# Patient Record
Sex: FEMALE | Race: BLACK OR AFRICAN AMERICAN
[De-identification: names, ages, dates, MRNs, and addresses within clinical notes are randomized per-mention and may not be internally consistent; named-entity substitution may affect disease eponyms.]

---

## 2017-07-31 ENCOUNTER — HOSPITAL ENCOUNTER (EMERGENCY)
Dept: HOSPITAL 23 - SED | Age: 22
Discharge: HOME | End: 2017-07-31
Payer: COMMERCIAL

## 2017-07-31 DIAGNOSIS — N76.0: Primary | ICD-10-CM

## 2017-07-31 LAB
MICRO INDICATED?: NO
URINE APPEARANCE: CLEAR
URINE BILIRUBIN: (no result)
URINE BLOOD: (no result)
URINE COLOR: YELLOW
URINE GLUCOSE: (no result) MG/DL
URINE KETONE: (no result)
URINE LEUKOCYTE ESTERASE: (no result)
URINE NITRATE: (no result)
URINE PH: 6.5 (ref 5–8)
URINE PROTEIN: (no result)
URINE SOURCE: (no result)
URINE SPECIFIC GRAVITY: 1.02 (ref 1–1.03)
URINE UROBILINOGEN: 0.2 MG/DL

## 2019-04-24 ENCOUNTER — HOSPITAL ENCOUNTER (OUTPATIENT)
Dept: OTHER | Facility: HOSPITAL | Age: 24
Discharge: HOME | End: 2019-04-24

## 2019-04-24 LAB
25(OH)D3 SERPL-MCNC: 11.4 NG/ML (ref 30–100)
ALBUMIN SERPL-MCNC: 4.5 G/DL (ref 3.5–5)
ALBUMIN/GLOB SERPL: 1.4 {RATIO} (ref 1.4–2.6)
ALP SERPL-CCNC: 42 U/L (ref 42–98)
ALT SERPL-CCNC: 16 U/L (ref 10–40)
ANION GAP SERPL CALC-SCNC: 16 MMOL/L (ref 8–19)
AST SERPL-CCNC: 19 U/L (ref 15–50)
BASOPHILS # BLD MANUAL: 0.03 10*3/UL (ref 0–0.2)
BASOPHILS NFR BLD MANUAL: 0.6 % (ref 0–3)
BILIRUB SERPL-MCNC: 0.4 MG/DL (ref 0.2–1.3)
BUN SERPL-MCNC: 10 MG/DL (ref 5–25)
BUN/CREAT SERPL: 13 {RATIO} (ref 6–20)
CALCIUM SERPL-MCNC: 9 MG/DL (ref 8.7–10.4)
CHLORIDE SERPL-SCNC: 105 MMOL/L (ref 99–111)
CONV CO2: 23 MMOL/L (ref 22–32)
CONV TOTAL PROTEIN: 7.7 G/DL (ref 6.3–8.2)
CREAT UR-MCNC: 0.76 MG/DL (ref 0.5–0.9)
DEPRECATED RDW RBC AUTO: 38.2 FL
EOSINOPHIL # BLD MANUAL: 0.04 10*3/UL (ref 0–0.7)
EOSINOPHIL NFR BLD MANUAL: 0.8 % (ref 0–7)
ERYTHROCYTE [DISTWIDTH] IN BLOOD BY AUTOMATED COUNT: 11.8 % (ref 11.5–14.5)
GFR SERPLBLD BASED ON 1.73 SQ M-ARVRAT: >60 ML/MIN/{1.73_M2}
GLOBULIN UR ELPH-MCNC: 3.2 G/DL (ref 2–3.5)
GLUCOSE SERPL-MCNC: 85 MG/DL (ref 65–99)
GRANS (ABSOLUTE): 2.71 10*3/UL (ref 2–8)
GRANS: 55.6 % (ref 30–85)
HBA1C MFR BLD: 12.7 G/DL (ref 12–16)
HCT VFR BLD AUTO: 38 % (ref 37–47)
IMM GRANULOCYTES # BLD: 0 10*3/UL (ref 0–0.54)
IMM GRANULOCYTES NFR BLD: 0 % (ref 0–0.43)
LYMPHOCYTES # BLD MANUAL: 1.78 10*3/UL (ref 1–5)
LYMPHOCYTES NFR BLD MANUAL: 6.4 % (ref 3–10)
MCH RBC QN AUTO: 28.8 PG (ref 27–31)
MCHC RBC AUTO-ENTMCNC: 33.4 G/DL (ref 33–37)
MCV RBC AUTO: 86.2 FL (ref 81–99)
MONOCYTES # BLD AUTO: 0.31 10*3/UL (ref 0.2–1.2)
OSMOLALITY SERPL CALC.SUM OF ELEC: 288 MOSM/KG (ref 273–304)
PLATELET # BLD AUTO: 418 10*3/UL (ref 130–400)
PMV BLD AUTO: 9.3 FL (ref 7.4–10.4)
POTASSIUM SERPL-SCNC: 4 MMOL/L (ref 3.5–5.3)
RBC # BLD AUTO: 4.41 10*6/UL (ref 4.2–5.4)
SODIUM SERPL-SCNC: 140 MMOL/L (ref 135–147)
TSH SERPL-ACNC: 1.16 M[IU]/L (ref 0.27–4.2)
VARIANT LYMPHS NFR BLD MANUAL: 36.6 % (ref 20–45)
WBC # BLD AUTO: 4.87 10*3/UL (ref 4.8–10.8)

## 2019-04-26 LAB
AMOXICILLIN+CLAV SUSC ISLT: 4
AMPICILLIN SUSC ISLT: >=32
AMPICILLIN+SULBAC SUSC ISLT: 8
BACTERIA UR CULT: ABNORMAL
CEFAZOLIN SUSC ISLT: <=4
CEFEPIME SUSC ISLT: <=1
CEFTAZIDIME SUSC ISLT: <=1
CEFTRIAXONE SUSC ISLT: <=1
CEFUROXIME ORAL SUSC ISLT: <=1
CEFUROXIME PARENTER SUSC ISLT: <=1
CIPROFLOXACIN SUSC ISLT: <=0.25
ERTAPENEM SUSC ISLT: <=0.5
GENTAMICIN SUSC ISLT: <=1
LEVOFLOXACIN SUSC ISLT: <=0.12
NITROFURANTOIN SUSC ISLT: <=16
TETRACYCLINE SUSC ISLT: <=1
TMP SMX SUSC ISLT: <=20
TOBRAMYCIN SUSC ISLT: <=1

## 2019-04-27 LAB
CONV EBV EARLY ANTIGEN: <5 U/ML (ref 0–10.9)
CONV EBV NUCLEAR ANTIGEN: >600 U/ML (ref 0–21.9)
CONV EPSTEIN BARR VIRAL CAPSID IGM: 26.2 U/ML (ref 0–43.9)
CONV EPSTEIN BARR VIRUS ANTIBODY TO VIRAL CAPSID IGG: 255 U/ML (ref 0–21.9)

## 2021-04-14 ENCOUNTER — HOSPITAL ENCOUNTER (OUTPATIENT)
Dept: URGENT CARE | Facility: CLINIC | Age: 26
Discharge: HOME | End: 2021-04-14
Attending: FAMILY MEDICINE

## 2021-11-11 ENCOUNTER — APPOINTMENT (OUTPATIENT)
Dept: ULTRASOUND IMAGING | Facility: HOSPITAL | Age: 26
End: 2021-11-11

## 2021-11-11 ENCOUNTER — HOSPITAL ENCOUNTER (EMERGENCY)
Facility: HOSPITAL | Age: 26
Discharge: HOME | End: 2021-11-11
Attending: EMERGENCY MEDICINE | Admitting: EMERGENCY MEDICINE

## 2021-11-11 VITALS
SYSTOLIC BLOOD PRESSURE: 121 MMHG | OXYGEN SATURATION: 100 % | HEART RATE: 77 BPM | HEIGHT: 61 IN | DIASTOLIC BLOOD PRESSURE: 79 MMHG | WEIGHT: 110.89 LBS | BODY MASS INDEX: 20.94 KG/M2 | RESPIRATION RATE: 18 BRPM | TEMPERATURE: 98 F

## 2021-11-11 DIAGNOSIS — N92.1 MENOMETRORRHAGIA: Primary | ICD-10-CM

## 2021-11-11 LAB
B-HCG UR QL: NEGATIVE
BACTERIA UR QL AUTO: ABNORMAL /HPF
BASOPHILS # BLD AUTO: 0.06 10*3/MM3 (ref 0–0.2)
BASOPHILS NFR BLD AUTO: 1.7 % (ref 0–1.5)
BILIRUB UR QL STRIP: NEGATIVE
CLARITY UR: CLEAR
COLOR UR: YELLOW
DEPRECATED RDW RBC AUTO: 37.5 FL (ref 37–54)
EOSINOPHIL # BLD AUTO: 0.07 10*3/MM3 (ref 0–0.4)
EOSINOPHIL NFR BLD AUTO: 2 % (ref 0.3–6.2)
ERYTHROCYTE [DISTWIDTH] IN BLOOD BY AUTOMATED COUNT: 11.8 % (ref 12.3–15.4)
GLUCOSE UR STRIP-MCNC: NEGATIVE MG/DL
HCT VFR BLD AUTO: 38 % (ref 34–46.6)
HGB BLD-MCNC: 12.3 G/DL (ref 12–15.9)
HGB UR QL STRIP.AUTO: ABNORMAL
HOLD SPECIMEN: NORMAL
HOLD SPECIMEN: NORMAL
HYALINE CASTS UR QL AUTO: ABNORMAL /LPF
IMM GRANULOCYTES # BLD AUTO: 0.01 10*3/MM3 (ref 0–0.05)
IMM GRANULOCYTES NFR BLD AUTO: 0.3 % (ref 0–0.5)
KETONES UR QL STRIP: ABNORMAL
LEUKOCYTE ESTERASE UR QL STRIP.AUTO: NEGATIVE
LYMPHOCYTES # BLD AUTO: 1.4 10*3/MM3 (ref 0.7–3.1)
LYMPHOCYTES NFR BLD AUTO: 40 % (ref 19.6–45.3)
MCH RBC QN AUTO: 28.6 PG (ref 26.6–33)
MCHC RBC AUTO-ENTMCNC: 32.4 G/DL (ref 31.5–35.7)
MCV RBC AUTO: 88.4 FL (ref 79–97)
MONOCYTES # BLD AUTO: 0.31 10*3/MM3 (ref 0.1–0.9)
MONOCYTES NFR BLD AUTO: 8.9 % (ref 5–12)
NEUTROPHILS NFR BLD AUTO: 1.65 10*3/MM3 (ref 1.7–7)
NEUTROPHILS NFR BLD AUTO: 47.1 % (ref 42.7–76)
NITRITE UR QL STRIP: NEGATIVE
NRBC BLD AUTO-RTO: 0 /100 WBC (ref 0–0.2)
PH UR STRIP.AUTO: 7 [PH] (ref 5–8)
PLATELET # BLD AUTO: 429 10*3/MM3 (ref 140–450)
PMV BLD AUTO: 9.5 FL (ref 6–12)
PROT UR QL STRIP: NEGATIVE
RBC # BLD AUTO: 4.3 10*6/MM3 (ref 3.77–5.28)
RBC # UR: ABNORMAL /HPF
REF LAB TEST METHOD: ABNORMAL
SP GR UR STRIP: 1.01 (ref 1–1.03)
SQUAMOUS #/AREA URNS HPF: ABNORMAL /HPF
UROBILINOGEN UR QL STRIP: ABNORMAL
WBC # BLD AUTO: 3.5 10*3/MM3 (ref 3.4–10.8)
WBC UR QL AUTO: ABNORMAL /HPF
WHOLE BLOOD HOLD SPECIMEN: NORMAL
WHOLE BLOOD HOLD SPECIMEN: NORMAL

## 2021-11-11 PROCEDURE — 36415 COLL VENOUS BLD VENIPUNCTURE: CPT | Performed by: EMERGENCY MEDICINE

## 2021-11-11 PROCEDURE — 76830 TRANSVAGINAL US NON-OB: CPT

## 2021-11-11 PROCEDURE — 81001 URINALYSIS AUTO W/SCOPE: CPT | Performed by: EMERGENCY MEDICINE

## 2021-11-11 PROCEDURE — 81025 URINE PREGNANCY TEST: CPT | Performed by: EMERGENCY MEDICINE

## 2021-11-11 PROCEDURE — 99283 EMERGENCY DEPT VISIT LOW MDM: CPT

## 2021-11-11 PROCEDURE — 85025 COMPLETE CBC W/AUTO DIFF WBC: CPT | Performed by: EMERGENCY MEDICINE

## 2021-11-11 RX ORDER — SODIUM CHLORIDE 0.9 % (FLUSH) 0.9 %
10 SYRINGE (ML) INJECTION AS NEEDED
Status: DISCONTINUED | OUTPATIENT
Start: 2021-11-11 | End: 2021-11-11 | Stop reason: HOSPADM

## 2021-11-11 NOTE — ED PROVIDER NOTES
Time: 10:16 AM EST  Arrived by: private car  Chief Complaint: Irregular vaginal bleeding  History provided by: Patient  History is limited by: N/A     History of Present Illness:  Patient is a 26 y.o. year old female that presents to the emergency department with several months of irregular r vaginal bleeding.  Patient has been seen multiple times at outside facilities for this complaint since the start of this year.  Most recently she was seen in August 2021 for the same complaint.  She had a pelvic exam and ultrasound.  She states that they found no definite cyst but that was her presumed diagnosis.  She had testing for STDs that all came back negative.  She denies any vaginal discharge or vaginal lesions.  She had no insurance, so was waiting to see her OB/GYN.  She complains of having vaginal bleeding daily in the past month.  She states she has gone through 3 pads per day on average.  This has caused her to have lightheadedness, worse with standing.  She complains of right adnexal crampy/sharp pain intermittently.    HPI    Similar Symptoms Previously: Yes  Recently seen: Yes      Patient Care Team  Primary Care Provider: Provider, No Known    Past Medical History:     Allergies   Allergen Reactions   • Latex Hives   • Penicillins Hives     History reviewed. No pertinent past medical history.  History reviewed. No pertinent surgical history.  History reviewed. No pertinent family history.    Home Medications:  Prior to Admission medications    Not on File        Social History:   Social History     Tobacco Use   • Smoking status: Not on file   • Smokeless tobacco: Not on file   Substance Use Topics   • Alcohol use: Not on file   • Drug use: Not on file     Recent travel: no     Review of Systems:  Review of Systems   Constitutional: Negative for chills and fever.   HENT: Negative for congestion, rhinorrhea and sore throat.    Eyes: Negative for pain.   Respiratory: Negative for apnea, cough, chest tightness and  "shortness of breath.    Cardiovascular: Negative for chest pain and palpitations.   Gastrointestinal: Positive for nausea. Negative for abdominal pain, constipation, diarrhea and vomiting.   Genitourinary: Positive for pelvic pain and vaginal bleeding. Negative for difficulty urinating, dysuria, genital sores and vaginal discharge.   Musculoskeletal: Positive for back pain. Negative for joint swelling and myalgias.   Skin: Negative for color change and rash.   Neurological: Negative for seizures and headaches.   Psychiatric/Behavioral: Negative.    All other systems reviewed and are negative.       Physical Exam:  /79 (BP Location: Left arm, Patient Position: Sitting)   Pulse 77   Temp 98 °F (36.7 °C) (Oral)   Resp 18   Ht 154.9 cm (61\")   Wt 50.3 kg (110 lb 14.3 oz)   SpO2 100%   Breastfeeding No   BMI 20.95 kg/m²     Physical Exam  Vitals and nursing note reviewed.   Constitutional:       General: She is not in acute distress.     Appearance: Normal appearance. She is normal weight.   HENT:      Head: Normocephalic and atraumatic.      Nose: Nose normal.      Mouth/Throat:      Mouth: Mucous membranes are dry.   Eyes:      Extraocular Movements: Extraocular movements intact.      Pupils: Pupils are equal, round, and reactive to light.   Cardiovascular:      Rate and Rhythm: Normal rate and regular rhythm.      Heart sounds: Normal heart sounds.   Pulmonary:      Effort: Pulmonary effort is normal.      Breath sounds: Normal breath sounds.   Abdominal:      General: Abdomen is flat. Bowel sounds are normal.      Palpations: Abdomen is soft.      Tenderness: There is abdominal tenderness (Mild TTP right adnexal area).   Musculoskeletal:         General: No swelling. Normal range of motion.      Cervical back: Normal range of motion and neck supple.   Skin:     General: Skin is warm and dry.      Coloration: Skin is not jaundiced.   Neurological:      General: No focal deficit present.      Mental " Status: She is alert and oriented to person, place, and time. Mental status is at baseline.   Psychiatric:         Mood and Affect: Mood normal.         Behavior: Behavior normal.         Judgment: Judgment normal.                Medications in the Emergency Department:  Medications   sodium chloride 0.9 % flush 10 mL (has no administration in time range)        Labs  Lab Results (last 24 hours)     Procedure Component Value Units Date/Time    CBC & Differential [[id number]]  (Abnormal) Collected: 11/11/21 0850    Specimen: Blood Updated: 11/11/21 0930    Narrative:      The following orders were created for panel order CBC & Differential.  Procedure                               Abnormality         Status                     ---------                               -----------         ------                     CBC Auto Differential[[id number]]        Abnormal            Final result                 Please view results for these tests on the individual orders.    CBC Auto Differential [[id number]]  (Abnormal) Collected: 11/11/21 0850    Specimen: Blood Updated: 11/11/21 0930     WBC 3.50 10*3/mm3      RBC 4.30 10*6/mm3      Hemoglobin 12.3 g/dL      Hematocrit 38.0 %      MCV 88.4 fL      MCH 28.6 pg      MCHC 32.4 g/dL      RDW 11.8 %      RDW-SD 37.5 fl      MPV 9.5 fL      Platelets 429 10*3/mm3      Neutrophil % 47.1 %      Lymphocyte % 40.0 %      Monocyte % 8.9 %      Eosinophil % 2.0 %      Basophil % 1.7 %      Immature Grans % 0.3 %      Neutrophils, Absolute 1.65 10*3/mm3      Lymphocytes, Absolute 1.40 10*3/mm3      Monocytes, Absolute 0.31 10*3/mm3      Eosinophils, Absolute 0.07 10*3/mm3      Basophils, Absolute 0.06 10*3/mm3      Immature Grans, Absolute 0.01 10*3/mm3      nRBC 0.0 /100 WBC     Urinalysis With Culture If Indicated - Urine, Clean Catch [[id number]]  (Abnormal) Collected: 11/11/21 1248    Specimen: Urine, Clean Catch Updated: 11/11/21 1309     Color, UA Yellow     Appearance, UA Clear      pH, UA 7.0     Specific Gravity, UA 1.011     Glucose, UA Negative     Ketones, UA Trace     Bilirubin, UA Negative     Blood, UA Moderate (2+)     Protein, UA Negative     Leuk Esterase, UA Negative     Nitrite, UA Negative     Urobilinogen, UA 0.2 E.U./dL    Pregnancy, Urine - Urine, Clean Catch [[id number]]  (Normal) Collected: 11/11/21 1248    Specimen: Urine, Clean Catch Updated: 11/11/21 1306     HCG, Urine QL Negative    Narrative:      Sensitive immunoassays may demonstrate false positive results  with specimens containing heterophilic antibodies. Assays may  also exhibit false-positive or false-negative results with  specimens containing human anti-mouse antibodies. These   specimens may come from patients receving preparations of  mouse monoclonal antibodies for diagnosis or therapy or having  been exposed to mice. If the qualitative interpretation is  inconsistent with the clinical evaluation, results should be   confirmed by an alternate hCG method, ie. quantitative hCG.  As with all urine pregnancy test, this test does not reliably  detect hCG degradation products, including free-beta hCG and  beta core fragments.    Urinalysis, Microscopic Only - Urine, Clean Catch [[id number]]  (Abnormal) Collected: 11/11/21 1248    Specimen: Urine, Clean Catch Updated: 11/11/21 1309     RBC, UA 3-5 /HPF      WBC, UA 0-2 /HPF      Bacteria, UA 1+ /HPF      Squamous Epithelial Cells, UA 0-2 /HPF      Hyaline Casts, UA None Seen /LPF      Methodology Automated Microscopy           Imaging:  US Non-ob Transvaginal    Result Date: 11/11/2021  PROCEDURE: US NON-OB TRANSVAGINAL  COMPARISON: None  INDICATIONS: Irregular vaginal bleeding and right adnexal pain  TECHNIQUE: Ultrasound examination of the pelvis was performed, using endovaginal technique.   FINDINGS:  UTERUS: Size is 6.5 x 3.1 x 4.6 cm with unremarkable appearance.  Endometrial thickness is 6.0 mm.   ADNEXAE: Normal bilateral appearance with no significant masses.   "Each ovary is normal in size for a patient of this age.  The right ovary measures 3.4 x 2.4 x 2.7 cm, while the left ovary measures 2.6 x 1.9 x 1.9 cm. CUL-DE-SAC: Normal.  No fluid or mass.  OTHER: Negative.   CONCLUSION: Normal examination for a patient of this age.      YAW BURGER MD       Electronically Signed and Approved By: YAW BURGER MD on 11/11/2021 at 13:42               Procedures:  Procedures    Progress  ED Course as of 11/11/21 1402   u Nov 11, 2021   1334 Case discussed with ultrasound.  Exam ended over 2 hours ago, but it seems may be the imaging was not \"sent over right\".  They are working on this for me. [RP]      ED Course User Index  [RP] Toby Gonzales MD                            Medical Decision Making:  MDM  Number of Diagnoses or Management Options     Amount and/or Complexity of Data Reviewed  Clinical lab tests: reviewed  Tests in the radiology section of CPT®: reviewed  Independent visualization of images, tracings, or specimens: yes    Risk of Complications, Morbidity, and/or Mortality  Presenting problems: low  Management options: low    Patient Progress  Patient progress: stable       Final diagnoses:   Menometrorrhagia        Disposition:  ED Disposition     ED Disposition Condition Comment    Discharge Stable           This medical record created using voice recognition software and may contain unintended errors.         Toby Gonzales MD  11/11/21 1402    "

## 2021-11-11 NOTE — DISCHARGE INSTRUCTIONS
Return to the emergency department for worsening of symptoms, any passing out episodes.  Drink plenty of fluids.

## 2022-01-05 PROBLEM — N92.1 MENOMETRORRHAGIA: Status: ACTIVE | Noted: 2022-01-05

## 2022-01-11 ENCOUNTER — OFFICE VISIT (OUTPATIENT)
Dept: OBSTETRICS AND GYNECOLOGY | Facility: CLINIC | Age: 27
End: 2022-01-11

## 2022-01-11 VITALS
SYSTOLIC BLOOD PRESSURE: 122 MMHG | HEART RATE: 83 BPM | WEIGHT: 114 LBS | HEIGHT: 61 IN | DIASTOLIC BLOOD PRESSURE: 79 MMHG | BODY MASS INDEX: 21.52 KG/M2

## 2022-01-11 DIAGNOSIS — R10.2 PELVIC PAIN IN FEMALE: ICD-10-CM

## 2022-01-11 DIAGNOSIS — N76.0 ACUTE VAGINITIS: ICD-10-CM

## 2022-01-11 DIAGNOSIS — N93.9 ABNORMAL UTERINE BLEEDING (AUB): Primary | ICD-10-CM

## 2022-01-11 LAB
B-HCG UR QL: NEGATIVE
C TRACH RRNA CVX QL NAA+PROBE: NOT DETECTED
CANDIDA SPECIES: NEGATIVE
DEPRECATED RDW RBC AUTO: 35.2 FL (ref 37–54)
ERYTHROCYTE [DISTWIDTH] IN BLOOD BY AUTOMATED COUNT: 11.3 % (ref 12.3–15.4)
EXPIRATION DATE: NORMAL
GARDNERELLA VAGINALIS: POSITIVE
HCG INTACT+B SERPL-ACNC: <0.5 MIU/ML
HCT VFR BLD AUTO: 38.6 % (ref 34–46.6)
HGB BLD-MCNC: 12.6 G/DL (ref 12–15.9)
INTERNAL NEGATIVE CONTROL: NEGATIVE
INTERNAL POSITIVE CONTROL: POSITIVE
Lab: NORMAL
MCH RBC QN AUTO: 28.2 PG (ref 26.6–33)
MCHC RBC AUTO-ENTMCNC: 32.6 G/DL (ref 31.5–35.7)
MCV RBC AUTO: 86.4 FL (ref 79–97)
N GONORRHOEA RRNA SPEC QL NAA+PROBE: NOT DETECTED
PLATELET # BLD AUTO: 432 10*3/MM3 (ref 140–450)
PMV BLD AUTO: 10.8 FL (ref 6–12)
PROLACTIN SERPL-MCNC: 9.83 NG/ML (ref 4.79–23.3)
RBC # BLD AUTO: 4.47 10*6/MM3 (ref 3.77–5.28)
T VAGINALIS DNA VAG QL PROBE+SIG AMP: NEGATIVE
T4 FREE SERPL-MCNC: 1.22 NG/DL (ref 0.93–1.7)
TSH SERPL DL<=0.05 MIU/L-ACNC: 1.65 UIU/ML (ref 0.27–4.2)
WBC NRBC COR # BLD: 3.43 10*3/MM3 (ref 3.4–10.8)

## 2022-01-11 PROCEDURE — 84443 ASSAY THYROID STIM HORMONE: CPT | Performed by: OBSTETRICS & GYNECOLOGY

## 2022-01-11 PROCEDURE — 99214 OFFICE O/P EST MOD 30 MIN: CPT | Performed by: OBSTETRICS & GYNECOLOGY

## 2022-01-11 PROCEDURE — 84439 ASSAY OF FREE THYROXINE: CPT | Performed by: OBSTETRICS & GYNECOLOGY

## 2022-01-11 PROCEDURE — 81025 URINE PREGNANCY TEST: CPT | Performed by: OBSTETRICS & GYNECOLOGY

## 2022-01-11 PROCEDURE — 87491 CHLMYD TRACH DNA AMP PROBE: CPT | Performed by: OBSTETRICS & GYNECOLOGY

## 2022-01-11 PROCEDURE — 84702 CHORIONIC GONADOTROPIN TEST: CPT | Performed by: OBSTETRICS & GYNECOLOGY

## 2022-01-11 PROCEDURE — 87591 N.GONORRHOEAE DNA AMP PROB: CPT | Performed by: OBSTETRICS & GYNECOLOGY

## 2022-01-11 PROCEDURE — 87660 TRICHOMONAS VAGIN DIR PROBE: CPT | Performed by: OBSTETRICS & GYNECOLOGY

## 2022-01-11 PROCEDURE — 87480 CANDIDA DNA DIR PROBE: CPT | Performed by: OBSTETRICS & GYNECOLOGY

## 2022-01-11 PROCEDURE — 87510 GARDNER VAG DNA DIR PROBE: CPT | Performed by: OBSTETRICS & GYNECOLOGY

## 2022-01-11 PROCEDURE — 85027 COMPLETE CBC AUTOMATED: CPT | Performed by: OBSTETRICS & GYNECOLOGY

## 2022-01-11 PROCEDURE — 84146 ASSAY OF PROLACTIN: CPT | Performed by: OBSTETRICS & GYNECOLOGY

## 2022-01-11 NOTE — PROGRESS NOTES
"GYN Problem/Follow Up Visit    Chief Complaint   Patient presents with   • abnormal bleeding           HPI  Jami Gutierrez is a 26 y.o. female, No obstetric history on file., who presents for pelvic pain x one year and aub x 6 months. States pain is crampy and intermittent, worse on right lower side. States bleeding is very irregular. Sometimes will only have a couple days in between bleeding episodes. Will pass large clots. Bled every day for two months starting in august. Now bleeding frequently. Has been seen in er and urgent care and was referred here. Also states has noticed a vaginal odor at times. Also c/o painful intercourse at times.       Additional OB/GYN History   No LMP recorded (lmp unknown).  Current contraception: contraceptive methods: None  Allergies : Latex and Penicillins     The additional following portions of the patient's history were reviewed and updated as appropriate: allergies, current medications, past family history, past medical history, past social history, past surgical history and problem list.    Review of Systems    I have reviewed and agree with the HPI, ROS, and historical information as entered above. Rachael Bliss, APRN    Objective   /79   Pulse 83   Ht 154.9 cm (61\")   Wt 51.7 kg (114 lb)   LMP  (LMP Unknown)   BMI 21.54 kg/m²     Physical Exam  Vitals reviewed.   Genitourinary:     General: Normal vulva.      Vagina: Vaginal discharge (thin yellowish) present. No erythema, tenderness, bleeding or lesions.      Cervix: Discharge (thin yellowish) present. No cervical motion tenderness, friability, lesion, erythema or cervical bleeding.      Uterus: Normal.       Adnexa:         Right: Tenderness present.         Left: Tenderness present.       Comments: + odor  Skin:     General: Skin is warm and dry.   Neurological:      Mental Status: She is alert and oriented to person, place, and time.            Assessment and Plan    Diagnoses and all orders for this " visit:    1. Abnormal uterine bleeding (AUB) (Primary)  -     POC Pregnancy, Urine  -     US Pelvis Transvaginal Non OB; Future  -     CBC (No Diff)  -     Prolactin  -     T4, Free  -     TSH  -     hCG, Quantitative, Pregnancy  -     Chlamydia trachomatis, Neisseria gonorrhoeae, PCR - Swab, Cervix  -     Gardnerella vaginalis, Trichomonas vaginalis, Candida albicans, DNA - Swab, Vagina    2. Pelvic pain in female  -     US Pelvis Transvaginal Non OB; Future  -     hCG, Quantitative, Pregnancy  -     Chlamydia trachomatis, Neisseria gonorrhoeae, PCR - Swab, Cervix  -     Gardnerella vaginalis, Trichomonas vaginalis, Candida albicans, DNA - Swab, Vagina    3. Acute vaginitis  -     Chlamydia trachomatis, Neisseria gonorrhoeae, PCR - Swab, Cervix  -     Gardnerella vaginalis, Trichomonas vaginalis, Candida albicans, DNA - Swab, Vagina    discussed tx options. Will r/o underlying conditions first and then discuss further. Also discussed seeing gyn doc here if sx persist to r/o endometriosis.    Counseling:  She understands the importance of having the above orders performed in a timely fashion.  She is encouraged to review her results online and/or contact or office if she has questions.     Follow Up:  Return for lab and u/s f/u.      Rachael Bliss, WILTON  01/11/2022

## 2022-01-12 RX ORDER — METRONIDAZOLE 500 MG/1
500 TABLET ORAL 2 TIMES DAILY
Qty: 14 TABLET | Refills: 0 | Status: SHIPPED | OUTPATIENT
Start: 2022-01-12 | End: 2022-01-19

## 2022-01-18 ENCOUNTER — TELEPHONE (OUTPATIENT)
Dept: OBSTETRICS AND GYNECOLOGY | Facility: CLINIC | Age: 27
End: 2022-01-18

## 2022-01-18 NOTE — TELEPHONE ENCOUNTER
----- Message from WILTON Pickard sent at 1/12/2022 12:08 PM EST -----  Please discuss results with pt. Flagyl sent. Thanks

## 2022-01-24 ENCOUNTER — HOSPITAL ENCOUNTER (EMERGENCY)
Facility: HOSPITAL | Age: 27
Discharge: HOME | End: 2022-01-25
Attending: EMERGENCY MEDICINE | Admitting: EMERGENCY MEDICINE

## 2022-01-24 VITALS
SYSTOLIC BLOOD PRESSURE: 116 MMHG | RESPIRATION RATE: 16 BRPM | HEART RATE: 90 BPM | BODY MASS INDEX: 21.06 KG/M2 | HEIGHT: 61 IN | WEIGHT: 111.55 LBS | DIASTOLIC BLOOD PRESSURE: 72 MMHG | OXYGEN SATURATION: 100 % | TEMPERATURE: 98.4 F

## 2022-01-24 DIAGNOSIS — N92.1 MENOMETRORRHAGIA: Primary | ICD-10-CM

## 2022-01-24 LAB
BASOPHILS # BLD AUTO: 0.07 10*3/MM3 (ref 0–0.2)
BASOPHILS NFR BLD AUTO: 1.2 % (ref 0–1.5)
DEPRECATED RDW RBC AUTO: 38.4 FL (ref 37–54)
EOSINOPHIL # BLD AUTO: 0.07 10*3/MM3 (ref 0–0.4)
EOSINOPHIL NFR BLD AUTO: 1.2 % (ref 0.3–6.2)
ERYTHROCYTE [DISTWIDTH] IN BLOOD BY AUTOMATED COUNT: 11.9 % (ref 12.3–15.4)
HCG INTACT+B SERPL-ACNC: <0.5 MIU/ML
HCT VFR BLD AUTO: 38.6 % (ref 34–46.6)
HGB BLD-MCNC: 12.5 G/DL (ref 12–15.9)
HOLD SPECIMEN: NORMAL
HOLD SPECIMEN: NORMAL
IMM GRANULOCYTES # BLD AUTO: 0.01 10*3/MM3 (ref 0–0.05)
IMM GRANULOCYTES NFR BLD AUTO: 0.2 % (ref 0–0.5)
LYMPHOCYTES # BLD AUTO: 2.17 10*3/MM3 (ref 0.7–3.1)
LYMPHOCYTES NFR BLD AUTO: 37.8 % (ref 19.6–45.3)
MCH RBC QN AUTO: 28.3 PG (ref 26.6–33)
MCHC RBC AUTO-ENTMCNC: 32.4 G/DL (ref 31.5–35.7)
MCV RBC AUTO: 87.5 FL (ref 79–97)
MONOCYTES # BLD AUTO: 0.47 10*3/MM3 (ref 0.1–0.9)
MONOCYTES NFR BLD AUTO: 8.2 % (ref 5–12)
NEUTROPHILS NFR BLD AUTO: 2.95 10*3/MM3 (ref 1.7–7)
NEUTROPHILS NFR BLD AUTO: 51.4 % (ref 42.7–76)
NRBC BLD AUTO-RTO: 0 /100 WBC (ref 0–0.2)
PLATELET # BLD AUTO: 433 10*3/MM3 (ref 140–450)
PMV BLD AUTO: 9.2 FL (ref 6–12)
RBC # BLD AUTO: 4.41 10*6/MM3 (ref 3.77–5.28)
WBC NRBC COR # BLD: 5.74 10*3/MM3 (ref 3.4–10.8)
WHOLE BLOOD HOLD SPECIMEN: NORMAL
WHOLE BLOOD HOLD SPECIMEN: NORMAL

## 2022-01-24 PROCEDURE — 99283 EMERGENCY DEPT VISIT LOW MDM: CPT

## 2022-01-24 PROCEDURE — 85025 COMPLETE CBC W/AUTO DIFF WBC: CPT

## 2022-01-24 PROCEDURE — 84702 CHORIONIC GONADOTROPIN TEST: CPT

## 2022-01-24 PROCEDURE — 36415 COLL VENOUS BLD VENIPUNCTURE: CPT

## 2022-01-24 RX ORDER — SODIUM CHLORIDE 0.9 % (FLUSH) 0.9 %
10 SYRINGE (ML) INJECTION AS NEEDED
Status: DISCONTINUED | OUTPATIENT
Start: 2022-01-24 | End: 2022-01-25 | Stop reason: HOSPADM

## 2022-01-25 ENCOUNTER — TELEPHONE (OUTPATIENT)
Dept: OBSTETRICS AND GYNECOLOGY | Facility: CLINIC | Age: 27
End: 2022-01-25

## 2022-01-25 RX ORDER — IBUPROFEN 600 MG/1
600 TABLET ORAL EVERY 8 HOURS PRN
Qty: 30 TABLET | Refills: 0 | Status: SHIPPED | OUTPATIENT
Start: 2022-01-25 | End: 2022-02-04

## 2022-01-25 NOTE — ED NOTES
Pt discharged in stable condition. Pt given discharge instructions and verbalized understanding. Provider deemed pt stable for DC. Rx x1 given to pt, pt verbalized understanding on prescriptions. NAD noted. PILLO.        Alejandra Heredia, RN  01/25/22 0225

## 2022-01-25 NOTE — DISCHARGE INSTRUCTIONS
Please follow-up with your gynecologist in 1 week.    Please continue the birth control as previously prescribed.    Please have your gynecologist recheck a CBC next week.    Return to the emergency room immediately for increased bleeding, shortness of breath, unusual fatigue, near passing out, passing out, uncontrolled pain, fever or weakness

## 2022-01-25 NOTE — ED PROVIDER NOTES
"Time: 12:20 AM EST  Arrived by: Private car  Chief Complaint: Vaginal bleeding  History provided by: Patient  History is limited by: N/A     History of Present Illness:    Jami Gutierrez is a 26 y.o. female who presents to the emergency department today with complaints of moderate vaginal bleeding. The patient reports that for the past six months, she has had intermittent vaginal bleeding. She has also had intermittent \"dull stabbing\" pains to her pelvis which do not seem to coincide with the vaginal bleeding. She notes that her bleeding began as once daily on average when it occurred; however, she emphasizes that the bleeding has been intermittent overall and will often stop for two weeks at a time. More recently, she reports that her vaginal bleeding has increased in frequency and will now occur four or more times daily. She has noticed that she has been passing clots.    The patient advises that she has seen Rachael Bliss ([facility]) for her symptoms with most recent visit two weeks ago. The patient was informed at that time that all testing including blood work returned normal. She then saw her PCP and received more testing which also returned normal. She denies receiving an ultrasound during these visits, though she did have an ultrasound several months ago in the ED. The patient advises that she has been on oral birth control for the past two days.    The patient denies other known sources of bleeding including to her gums. She denies fever, chills, or vomiting. She denies weakness, shortness of breath, fatigue, syncope, or near-syncope. The patient denies increased urinary frequency, hesitancy, or dysuria. Her last bowel movement was yesterday and appeared normal with no hematochezia, melena, or diarrhea. She denies any vaginal discharge or odor.    The patient denies smoking or alcohol use. She denies a history of DVT or PE. She is sexually active but denies using any birth control. She denies trying for " pregnancy at this time. The patient notes that she is currently in a monogamous relationship with no concern for STIs. There are no other acute complaints at this time.            Similar Symptoms Previously: None prior to six months ago.  Recently seen: Patient was seen in by her OBGYN, Rachael Bliss, on 1/11/2022 for abnormal uterine bleeding.      Patient Care Team  Primary Care Provider: Provider, No Known    Past Medical History:     Allergies   Allergen Reactions   • Latex Hives   • Penicillins Hives     Past Medical History:   Diagnosis Date   • Abnormal uterine bleeding      No past surgical history on file.  No family history on file.    Home Medications:  Prior to Admission medications    Medication Sig Start Date End Date Taking? Authorizing Provider   desogestrel-ethinyl estradiol (Kariva) 0.15-0.02/0.01 MG (21/5) per tablet Take 1 tablet by mouth Daily. 1/5/22 1/5/23  Stefani English APRN        Social History:   Social History     Tobacco Use   • Smoking status: Never Smoker   • Smokeless tobacco: Never Used   Vaping Use   • Vaping Use: Never used   Substance Use Topics   • Alcohol use: Not Currently   • Drug use: Defer         Review of Systems:  Review of Systems   Constitutional: Negative for chills and fever.   HENT: Negative for nosebleeds.         No bleeding to gums.   Eyes: Negative for redness.   Respiratory: Negative for cough and shortness of breath.    Cardiovascular: Negative for chest pain.   Gastrointestinal: Negative for anal bleeding, blood in stool, constipation, diarrhea and vomiting.   Genitourinary: Positive for pelvic pain and vaginal bleeding. Negative for difficulty urinating, dysuria, frequency and urgency.        No odor.   Musculoskeletal: Negative for back pain and neck pain.   Skin: Negative for rash.   Neurological: Negative for dizziness, seizures, syncope, weakness and light-headedness.        Physical Exam:  /72 (BP Location: Right arm, Patient Position: Sitting)   " Pulse 90   Temp 98.4 °F (36.9 °C) (Oral)   Resp 16   Ht 154.9 cm (61\")   Wt 50.6 kg (111 lb 8.8 oz)   LMP  (LMP Unknown)   SpO2 100%   BMI 21.08 kg/m²     Physical Exam  Vitals and nursing note reviewed. Exam conducted with a chaperone present (Ofelia).   Constitutional:       General: She is not in acute distress.     Appearance: Normal appearance.   HENT:      Head: Normocephalic and atraumatic.      Nose: Nose normal.      Mouth/Throat:      Pharynx: Oropharynx is clear.   Eyes:      General: No scleral icterus.     Conjunctiva/sclera: Conjunctivae normal.   Cardiovascular:      Rate and Rhythm: Normal rate and regular rhythm.      Heart sounds: Normal heart sounds. No murmur heard.      Pulmonary:      Effort: No accessory muscle usage, respiratory distress or retractions.      Breath sounds: Normal breath sounds. No wheezing, rhonchi or rales.   Chest:      Chest wall: No tenderness.   Abdominal:      Palpations: Abdomen is soft.      Tenderness: There is abdominal tenderness (slight) in the suprapubic area. There is no guarding or rebound.      Comments: No rigidity.   Genitourinary:     Uterus: Normal.       Adnexa:         Right: No mass or tenderness.        Comments: No lesions or tears. No vaginal discharge. She does have some slight bleeding from the vagina. Vaginal vault does have some old blood present. The os is closed. No clots present. No cervical motion tenderness. No adnexal masses or tenderness.  Musculoskeletal:         General: No tenderness. Normal range of motion.      Cervical back: Normal range of motion and neck supple.      Right lower leg: No edema.      Left lower leg: No edema.   Skin:     General: Skin is warm and dry.   Neurological:      Mental Status: She is alert. Mental status is at baseline.   Psychiatric:         Mood and Affect: Mood normal.         Behavior: Behavior normal.                Medications in the Emergency Department:  Medications   sodium chloride 0.9 " % flush 10 mL (has no administration in time range)        Labs  Lab Results (last 24 hours)     Procedure Component Value Units Date/Time    CBC & Differential [[id number]]  (Abnormal) Collected: 01/24/22 2228    Specimen: Blood Updated: 01/24/22 2306    Narrative:      The following orders were created for panel order CBC & Differential.  Procedure                               Abnormality         Status                     ---------                               -----------         ------                     CBC Auto Differential[[id number]]        Abnormal            Final result                 Please view results for these tests on the individual orders.    hCG, Quantitative, Pregnancy [[id number]] Collected: 01/24/22 2228    Specimen: Blood Updated: 01/24/22 2332     HCG Quantitative <0.50 mIU/mL     Narrative:      HCG Ranges by Gestational Age    Females - non-pregnant premenopausal   </= 1mIU/mL HCG  Females - postmenopausal               </= 7mIU/mL HCG    3 Weeks       5.4   -      72 mIU/mL  4 Weeks      10.2   -     708 mIU/mL  5 Weeks       217   -   8,245 mIU/mL  6 Weeks       152   -  32,177 mIU/mL  7 Weeks     4,059   - 153,767 mIU/mL  8 Weeks    31,366   - 149,094 mIU/mL  9 Weeks    59,109   - 135,901 mIU/mL  10 Weeks   44,186   - 170,409 mIU/mL  12 Weeks   27,107   - 201,615 mIU/mL  14 Weeks   24,302   -  93,646 mIU/mL  15 Weeks   12,540   -  69,747 mIU/mL  16 Weeks    8,904   -  55,332 mIU/mL  17 Weeks    8,240   -  51,793 mIU/mL  18 Weeks    9,649   -  55,271 mIU/mL    Results may be falsely decreased if patient taking Biotin.      CBC Auto Differential [[id number]]  (Abnormal) Collected: 01/24/22 2228    Specimen: Blood Updated: 01/24/22 2306     WBC 5.74 10*3/mm3      RBC 4.41 10*6/mm3      Hemoglobin 12.5 g/dL      Hematocrit 38.6 %      MCV 87.5 fL      MCH 28.3 pg      MCHC 32.4 g/dL      RDW 11.9 %      RDW-SD 38.4 fl      MPV 9.2 fL      Platelets 433 10*3/mm3      Neutrophil % 51.4 %       Lymphocyte % 37.8 %      Monocyte % 8.2 %      Eosinophil % 1.2 %      Basophil % 1.2 %      Immature Grans % 0.2 %      Neutrophils, Absolute 2.95 10*3/mm3      Lymphocytes, Absolute 2.17 10*3/mm3      Monocytes, Absolute 0.47 10*3/mm3      Eosinophils, Absolute 0.07 10*3/mm3      Basophils, Absolute 0.07 10*3/mm3      Immature Grans, Absolute 0.01 10*3/mm3      nRBC 0.0 /100 WBC            Imaging:  No Radiology Exams Resulted Within Past 24 Hours    Procedures:  Procedures    Progress                            Medical Decision Making:  MDM  Number of Diagnoses or Management Options  Menometrorrhagia  Diagnosis management comments: At the time of discharge, the patient appeared well, no distress and nontoxic. The patient's symptoms have been going on for 6 months. The patient had a normal CBC. The patient's vital signs are stable. On examination, the patient had old vaginal blood within the vaginal vault. There was no bright red bleeding and there was no clots. The patient's bimanual exam demonstrated no masses or tenderness. The patient had an ultrasound performed in November when she presented with the same symptoms. The ultrasound was reviewed and normal. The patient is resting comfortably and appears appropriate for discharge. The patient will continue her hormone replacement therapy that she began 2 days ago and I placed her on ibuprofen for pain and cramping. The patient will follow up with her gynecologist for reevaluation in 1 week for the irregular vaginal bleeding as well as to recheck a CBC. The patient was given very specific instructions on when and why to return to the emergency room. The patient voiced understanding and felt comfortable with those instructions. The patient appears appropriate for discharge and outpatient follow-up.       Amount and/or Complexity of Data Reviewed  Review and summarize past medical records: yes (Prior ED ultrasound from November 2021:    PROCEDURE:  US NON-OB  TRANSVAGINAL     COMPARISON: None     INDICATIONS:  Irregular vaginal bleeding and right adnexal pain     TECHNIQUE:    Ultrasound examination of the pelvis was performed, using endovaginal technique.       FINDINGS:          UTERUS:           Size is 6.5 x 3.1 x 4.6 cm with unremarkable appearance.  Endometrial thickness is 6.0 mm.       ADNEXAE:        Normal bilateral appearance with no significant masses.  Each ovary is normal in size for   a patient of this age.  The right ovary measures 3.4 x 2.4 x 2.7 cm, while the left ovary measures   2.6 x 1.9 x 1.9 cm.   CUL-DE-SAC:   Normal.  No fluid or mass.    OTHER:             Negative.       CONCLUSION: Normal examination for a patient of this age.  )                 Final diagnoses:   Menometrorrhagia        Disposition:  ED Disposition     ED Disposition Condition Comment    Discharge Stable           Part of this note may be an electronic transcription/translation of spoken language to printed text using the Dragon Dictation System.     Documentation assistance provided by Yohana Macias acting as scribe for David Rueda DO. Information recorded by the scribe was done at my direction and has been verified and validated by me.        Yohana Macias  01/25/22 0032       Gilberto, Yohana  01/25/22 0032       Gilberto, Yohana  01/25/22 0052       Gilberto, Yohana  01/25/22 0053       Gilberto, Yohana  01/25/22 0053       Gilberto, Yohana  01/25/22 0054       Gilberto, Yohana  01/25/22 0133       Gilberto, Yohana  01/25/22 0134       David Rueda DO  01/26/22 0590

## 2022-01-26 ENCOUNTER — OFFICE VISIT (OUTPATIENT)
Dept: OBSTETRICS AND GYNECOLOGY | Facility: CLINIC | Age: 27
End: 2022-01-26

## 2022-01-26 VITALS
WEIGHT: 107.2 LBS | DIASTOLIC BLOOD PRESSURE: 82 MMHG | HEART RATE: 85 BPM | BODY MASS INDEX: 20.26 KG/M2 | SYSTOLIC BLOOD PRESSURE: 121 MMHG

## 2022-01-26 DIAGNOSIS — N93.9 ABNORMAL UTERINE BLEEDING: Primary | ICD-10-CM

## 2022-01-26 DIAGNOSIS — Z30.011 ENCOUNTER FOR INITIAL PRESCRIPTION OF CONTRACEPTIVE PILLS: ICD-10-CM

## 2022-01-26 PROCEDURE — 99214 OFFICE O/P EST MOD 30 MIN: CPT | Performed by: OBSTETRICS & GYNECOLOGY

## 2022-01-26 RX ORDER — NORETHINDRONE ACETATE AND ETHINYL ESTRADIOL AND FERROUS FUMARATE 1MG-20(24)
1 KIT ORAL DAILY
Qty: 84 TABLET | Refills: 3 | Status: SHIPPED | OUTPATIENT
Start: 2022-01-26 | End: 2022-10-10

## 2022-01-26 NOTE — PROGRESS NOTES
GYN Problem/Follow Up Visit    Chief Complaint   Patient presents with   • Abnormal Vaginal Bleeding     F/U ED           HPI  Jami Gutierrez is a 26 y.o. female, , who presents for lab and u/s f/u. Seen in office 22 with c/o aub and pain. was positive for bv and took flagyl.        Additional OB/GYN History   No LMP recorded (lmp unknown).  Current contraception: contraceptive methods: None  Desires to: start contraception  Allergies : Latex and Penicillins     The additional following portions of the patient's history were reviewed and updated as appropriate: allergies, current medications, past family history, past medical history, past social history, past surgical history and problem list.    Review of Systems    I have reviewed and agree with the HPI, ROS, and historical information as entered above. Rachael Bliss, APRN    Objective   /82   Pulse 85   Wt 48.6 kg (107 lb 3.2 oz)   LMP  (LMP Unknown) Comment: Bleeding on & off x 6 mos  Breastfeeding No   BMI 20.26 kg/m²     Physical Exam  Vitals reviewed.   Neurological:      Mental Status: She is alert and oriented to person, place, and time.            Assessment and Plan    Diagnoses and all orders for this visit:    1. Abnormal uterine bleeding (Primary)  -     norethindrone-ethinyl estradiol-ferrous fumarate (LOESTIN 24 FE) 1-20 MG-MCG(24) per tablet; Take 1 tablet by mouth Daily.  Dispense: 84 tablet; Refill: 3    2. Encounter for initial prescription of contraceptive pills  -     norethindrone-ethinyl estradiol-ferrous fumarate (LOESTIN 24 FE) 1-20 MG-MCG(24) per tablet; Take 1 tablet by mouth Daily.  Dispense: 84 tablet; Refill: 3    reviewed labs done 22: cbc/prolactin/free t4/tsh normal. Reviewed pelvic u/s done 21: normal. Discussed tx options including r/b/se. Pt desires bcp. Will recheck in 2-3 months, sooner if sx persist or any concerns.     Counseling:  She understands the importance of having the above  orders performed in a timely fashion.  She is encouraged to review her results online and/or contact or office if she has questions.     Follow Up:  Return in about 2 months (around 3/26/2022) for yuval michael/harlan Bliss, [facility]  01/26/2022

## 2022-03-24 ENCOUNTER — OFFICE VISIT (OUTPATIENT)
Dept: OBSTETRICS AND GYNECOLOGY | Facility: CLINIC | Age: 27
End: 2022-03-24

## 2022-03-24 VITALS
SYSTOLIC BLOOD PRESSURE: 129 MMHG | HEART RATE: 75 BPM | WEIGHT: 108 LBS | HEIGHT: 61 IN | BODY MASS INDEX: 20.39 KG/M2 | DIASTOLIC BLOOD PRESSURE: 72 MMHG

## 2022-03-24 DIAGNOSIS — Z30.41 ENCOUNTER FOR SURVEILLANCE OF CONTRACEPTIVE PILLS: Primary | ICD-10-CM

## 2022-03-24 PROCEDURE — 99212 OFFICE O/P EST SF 10 MIN: CPT | Performed by: OBSTETRICS & GYNECOLOGY

## 2022-03-24 NOTE — PROGRESS NOTES
"GYN Problem/Follow Up Visit    Chief Complaint   Patient presents with   • Follow up on birth control medicine           HPI  Jami Gutierrez is a 26 y.o. female, , who presents for bc f/u. Seen in office 22 with c/o aub and pain. Workup was normal. Started on loestrin . States first month went great. This month she had spotting right before the inactive pills and her menses was very painful. As soon as she started the next pack of active pills her sx stopped. No c/o today.       Additional OB/GYN History   Patient's last menstrual period was 2022 (approximate).  Current contraception: contraceptive methods: OCP (estrogen/progesterone)  Desires to: continue contraception  Allergies : Latex and Penicillins     The additional following portions of the patient's history were reviewed and updated as appropriate: allergies, current medications, past family history, past medical history, past social history, past surgical history and problem list.    Review of Systems    I have reviewed and agree with the HPI, ROS, and historical information as entered above. Rachael Bliss, APRN    Objective   /72   Pulse 75   Ht 154.9 cm (61\")   Wt 49 kg (108 lb)   LMP 2022 (Approximate)   BMI 20.41 kg/m²     Physical Exam  Vitals reviewed.   Abdominal:      Palpations: Abdomen is soft.      Tenderness: There is no abdominal tenderness.      Comments: No pelvic tenderness noted.   Skin:     General: Skin is warm and dry.   Neurological:      Mental Status: She is alert and oriented to person, place, and time.            Assessment and Plan    Diagnoses and all orders for this visit:    1. Encounter for surveillance of contraceptive pills (Primary)    happy with current bcp. Wants to give it more time. Will call back if next menses is heavy/painful and we will switch to a different bcp.    Counseling:  She understands the importance of having the above orders performed in a timely fashion.  She " is encouraged to review her results online and/or contact or office if she has questions.     Follow Up:  Return if symptoms worsen or fail to improve.      Rachael Bliss, APRN  03/24/2022

## 2022-10-26 ENCOUNTER — LAB (OUTPATIENT)
Dept: OBSTETRICS AND GYNECOLOGY | Facility: CLINIC | Age: 27
End: 2022-10-26

## 2022-10-26 ENCOUNTER — TELEPHONE (OUTPATIENT)
Dept: OBSTETRICS AND GYNECOLOGY | Facility: CLINIC | Age: 27
End: 2022-10-26

## 2022-10-26 DIAGNOSIS — O20.0 THREATENED ABORTION: ICD-10-CM

## 2022-10-26 DIAGNOSIS — O20.0 THREATENED ABORTION: Primary | ICD-10-CM

## 2022-10-26 LAB — HCG INTACT+B SERPL-ACNC: NORMAL MIU/ML

## 2022-10-26 PROCEDURE — 84702 CHORIONIC GONADOTROPIN TEST: CPT | Performed by: OBSTETRICS & GYNECOLOGY

## 2022-10-26 NOTE — TELEPHONE ENCOUNTER
Patient advised of the recommendations. She will come in this afternoon for the first HCG and to the hospital Friday to get it repeated before she goes in for work. She understands we will await the Friday results before further recommendations will be given. She voiced understanding of the precautions.

## 2022-10-26 NOTE — TELEPHONE ENCOUNTER
"Patient called stating she is pregnant with an LMP of 7/25. She states she went to the ER in [place], [place] for spotting and her pregnancy was confirmed. She has also been to an urgent care (records in [facility]) 10/10 and was seen-she was told to follow up at an ER then but no records in the system. She states she started having brown spotting and was passing \"clumps or tissue\" on Friday that lasted until Tuesday. She also had at that time some cramping and low back pain. She states the symptoms have resolved now but she is not sure if she had a miscarriage and is requesting recommendations. Please advise.  "

## 2022-10-31 NOTE — TELEPHONE ENCOUNTER
Called patient to see if she had the repeat HCG on Friday. She states she was unable to get to the lab. She was advised that it really needs to be performed. She states she would be unable to make it there today but should be able to go in the morning. She understood the importance of having this repeated.

## 2022-12-22 ENCOUNTER — INITIAL PRENATAL (OUTPATIENT)
Dept: OBSTETRICS AND GYNECOLOGY | Facility: CLINIC | Age: 27
End: 2022-12-22

## 2022-12-22 VITALS — SYSTOLIC BLOOD PRESSURE: 105 MMHG | BODY MASS INDEX: 19.08 KG/M2 | WEIGHT: 101 LBS | DIASTOLIC BLOOD PRESSURE: 73 MMHG

## 2022-12-22 DIAGNOSIS — Z34.90 ENCOUNTER FOR SUPERVISION OF NORMAL PREGNANCY, ANTEPARTUM, UNSPECIFIED GRAVIDITY: ICD-10-CM

## 2022-12-22 LAB
GLUCOSE UR STRIP-MCNC: NEGATIVE MG/DL
PROT UR STRIP-MCNC: ABNORMAL MG/DL

## 2022-12-22 PROCEDURE — 87186 SC STD MICRODIL/AGAR DIL: CPT | Performed by: OBSTETRICS & GYNECOLOGY

## 2022-12-22 PROCEDURE — 87491 CHLMYD TRACH DNA AMP PROBE: CPT | Performed by: OBSTETRICS & GYNECOLOGY

## 2022-12-22 PROCEDURE — 99214 OFFICE O/P EST MOD 30 MIN: CPT | Performed by: OBSTETRICS & GYNECOLOGY

## 2022-12-22 PROCEDURE — 87077 CULTURE AEROBIC IDENTIFY: CPT | Performed by: OBSTETRICS & GYNECOLOGY

## 2022-12-22 PROCEDURE — 87086 URINE CULTURE/COLONY COUNT: CPT | Performed by: OBSTETRICS & GYNECOLOGY

## 2022-12-22 PROCEDURE — 87661 TRICHOMONAS VAGINALIS AMPLIF: CPT | Performed by: OBSTETRICS & GYNECOLOGY

## 2022-12-22 PROCEDURE — 87591 N.GONORRHOEAE DNA AMP PROB: CPT | Performed by: OBSTETRICS & GYNECOLOGY

## 2022-12-22 PROCEDURE — G0123 SCREEN CERV/VAG THIN LAYER: HCPCS | Performed by: OBSTETRICS & GYNECOLOGY

## 2022-12-22 NOTE — PROGRESS NOTES
OB Initial Visit    CC- Here for care of current pregnancy     KENISHA Finalized: Due date finalized: 23    Subjective:  27 y.o.  presenting for her first obstetrical visit.    LMP: No LMP recorded. Patient is pregnant. sure    COMPLAINS OF: Pt has no complaints, is doing well, Nausea and vomiting has resolved.  No prenatal care thus far.        Reviewed and updated:  OBHx, GYNHx (STDs), PMHx, Medications, Allergies, PSHx, Social Hx, Preventative Hx (PAP), Hx of abuse/safe environment, Vaccine Hx including hx of chickenpox or vaccine, Genetic Hx (pt, FOB, both families).        Objective:  /73   Wt 45.8 kg (101 lb)   BMI 19.08 kg/m²   General- NAD, alert and oriented, appropriate  Psych- Normal mood, good memory  Neck- No masses, no thyroid enlargement  CV- Regular rhythm, no murnurs  Resp- CTA to bases, no wheezes  Abdomen- Soft, non distended, non tender, no masses     Breast left- No mass, non tender, no nipple discharge  Breast right- No mass, non tender, no nipple discharge    External genitalia- Normal, no lesions  Urethra- Normal, no masses, non tender  Vagina- Normal, no discharge  Bladder- Normal, no masses, non tender  Cvx- Normal, no lesions, no discharge, no CMT  Uterus- Normal shape and consistency, non tender, Consistent with dates,   Adnexa- Normal, no mass, non tender    Lymphatic- No palpable neck, axillary, or groin nodes  Ext- No edema, no cyanosis    Skin- No lesions, no rashes, no acanthosis nigricans      Assessment and Plan:  Diagnoses and all orders for this visit:    1. Encounter for supervision of normal pregnancy, antepartum, unspecified   -     IGP,CtNgTv,rfx Aptima HPV ASCU; Future  -     POC Urinalysis Dipstick  -     Urine Culture - Urine, Urine, Clean Catch; Future  -     OB Panel With HIV; Future  -     Hemoglobinopathy Fractionation Cascade; Future  -     Cancel: US Ob 14 + Weeks Single or First Gestation; Future  -     US Ob 14 + Weeks Single or First  Gestation; Future  -     IGP,CtNgTv,rfx Aptima HPV ASCU  -     Urine Culture - Urine, Urine, Clean Catch            16w1d    Genetic Screening: Considering   NIPS    Vaccines: Pt declines FLU vaccine  Declines COVID vaccine    Counseling: Nutrition discussed, calories, activity/exercise in pregnancy  Discussed dietary restrictions/safety food preparation in pregnancy  Reviewed what to expect prenatal visits, office providers and [facility] hospitalists  Appropriate trimester precautions provided, N/V, vag bleeding, cramping  Questions answered    Labs: Prenatal labs, cultures, and PAP performed (prn)    Return in about 5 weeks (around 1/26/2023) for Please schedule sono anatomy with next visit i have placed order.      Bijal Quinteros, DO  12/22/2022

## 2022-12-24 LAB — BACTERIA SPEC AEROBE CULT: ABNORMAL

## 2022-12-26 RX ORDER — NITROFURANTOIN 25; 75 MG/1; MG/1
100 CAPSULE ORAL 2 TIMES DAILY
Qty: 10 CAPSULE | Refills: 0 | Status: SHIPPED | OUTPATIENT
Start: 2022-12-26 | End: 2023-01-26

## 2022-12-27 ENCOUNTER — TELEPHONE (OUTPATIENT)
Dept: OBSTETRICS AND GYNECOLOGY | Facility: CLINIC | Age: 27
End: 2022-12-27

## 2022-12-27 NOTE — TELEPHONE ENCOUNTER
----- Message from Bijal Quinteros DO sent at 12/26/2022  5:42 PM EST -----  Notify patient of +urine culture.  I have sent rx to pharmacy  ----- Message -----  From: Manas Gonzalez MA  Sent: 12/22/2022  12:58 PM EST  To: Bijal Quinteros DO

## 2022-12-29 ENCOUNTER — TELEPHONE (OUTPATIENT)
Dept: OBSTETRICS AND GYNECOLOGY | Facility: CLINIC | Age: 27
End: 2022-12-29

## 2022-12-29 NOTE — TELEPHONE ENCOUNTER
This patient's prenatal labs were not drawn at her Initail Ob visit because our lab was closed that morning.  I called her to see about getting her scheduled for those labs before her ob follow up appointment, but our lab availability doesn't work well with her schedule because she works in [place].  She is going to have these labs drawn at the outpatient lab at [facility] in [place], and will call back to let us know what date she went once she has those done.  I asked that she try and get those done before her next follow up appointment.

## 2023-01-04 LAB
C TRACH RRNA CVX QL NAA+PROBE: NEGATIVE
CONV .: ABNORMAL
CYTOLOGIST CVX/VAG CYTO: ABNORMAL
CYTOLOGY CVX/VAG DOC CYTO: ABNORMAL
CYTOLOGY CVX/VAG DOC THIN PREP: ABNORMAL
DX ICD CODE: ABNORMAL
DX ICD CODE: ABNORMAL
HIV 1 & 2 AB SER-IMP: ABNORMAL
N GONORRHOEA RRNA CVX QL NAA+PROBE: NEGATIVE
OTHER STN SPEC: ABNORMAL
PATHOLOGIST CVX/VAG CYTO: ABNORMAL
RECOM F/U CVX/VAG CYTO: ABNORMAL
STAT OF ADQ CVX/VAG CYTO-IMP: ABNORMAL
T VAGINALIS RRNA SPEC QL NAA+PROBE: NEGATIVE

## 2023-01-05 ENCOUNTER — LAB (OUTPATIENT)
Dept: OBSTETRICS AND GYNECOLOGY | Facility: CLINIC | Age: 28
End: 2023-01-05
Payer: MEDICAID

## 2023-01-05 DIAGNOSIS — Z34.90 ENCOUNTER FOR SUPERVISION OF NORMAL PREGNANCY, ANTEPARTUM, UNSPECIFIED GRAVIDITY: ICD-10-CM

## 2023-01-05 LAB
ABO GROUP BLD: NORMAL
BASOPHILS # BLD AUTO: 0.05 10*3/MM3 (ref 0–0.2)
BASOPHILS NFR BLD AUTO: 0.6 % (ref 0–1.5)
BLD GP AB SCN SERPL QL: NEGATIVE
DEPRECATED RDW RBC AUTO: 37.2 FL (ref 37–54)
EOSINOPHIL # BLD AUTO: 0.06 10*3/MM3 (ref 0–0.4)
EOSINOPHIL NFR BLD AUTO: 0.8 % (ref 0.3–6.2)
ERYTHROCYTE [DISTWIDTH] IN BLOOD BY AUTOMATED COUNT: 11.7 % (ref 12.3–15.4)
HCT VFR BLD AUTO: 33.2 % (ref 34–46.6)
HGB BLD-MCNC: 11.2 G/DL (ref 12–15.9)
HIV1+2 AB SER QL: NORMAL
IMM GRANULOCYTES # BLD AUTO: 0.05 10*3/MM3 (ref 0–0.05)
IMM GRANULOCYTES NFR BLD AUTO: 0.6 % (ref 0–0.5)
LYMPHOCYTES # BLD AUTO: 1.57 10*3/MM3 (ref 0.7–3.1)
LYMPHOCYTES NFR BLD AUTO: 20 % (ref 19.6–45.3)
MCH RBC QN AUTO: 29.9 PG (ref 26.6–33)
MCHC RBC AUTO-ENTMCNC: 33.7 G/DL (ref 31.5–35.7)
MCV RBC AUTO: 88.8 FL (ref 79–97)
MONOCYTES # BLD AUTO: 0.57 10*3/MM3 (ref 0.1–0.9)
MONOCYTES NFR BLD AUTO: 7.3 % (ref 5–12)
NEUTROPHILS NFR BLD AUTO: 5.54 10*3/MM3 (ref 1.7–7)
NEUTROPHILS NFR BLD AUTO: 70.7 % (ref 42.7–76)
NRBC BLD AUTO-RTO: 0 /100 WBC (ref 0–0.2)
PLATELET # BLD AUTO: 386 10*3/MM3 (ref 140–450)
PMV BLD AUTO: 10 FL (ref 6–12)
RBC # BLD AUTO: 3.74 10*6/MM3 (ref 3.77–5.28)
RH BLD: POSITIVE
T PALLIDUM IGG SER QL: NORMAL
WBC NRBC COR # BLD: 7.84 10*3/MM3 (ref 3.4–10.8)

## 2023-01-05 PROCEDURE — 83020 HEMOGLOBIN ELECTROPHORESIS: CPT | Performed by: OBSTETRICS & GYNECOLOGY

## 2023-01-05 PROCEDURE — 86901 BLOOD TYPING SEROLOGIC RH(D): CPT | Performed by: OBSTETRICS & GYNECOLOGY

## 2023-01-05 PROCEDURE — 86900 BLOOD TYPING SEROLOGIC ABO: CPT | Performed by: OBSTETRICS & GYNECOLOGY

## 2023-01-05 PROCEDURE — G0432 EIA HIV-1/HIV-2 SCREEN: HCPCS | Performed by: OBSTETRICS & GYNECOLOGY

## 2023-01-05 PROCEDURE — 86803 HEPATITIS C AB TEST: CPT | Performed by: OBSTETRICS & GYNECOLOGY

## 2023-01-05 PROCEDURE — 86762 RUBELLA ANTIBODY: CPT | Performed by: OBSTETRICS & GYNECOLOGY

## 2023-01-05 PROCEDURE — 86850 RBC ANTIBODY SCREEN: CPT | Performed by: OBSTETRICS & GYNECOLOGY

## 2023-01-05 PROCEDURE — 87340 HEPATITIS B SURFACE AG IA: CPT | Performed by: OBSTETRICS & GYNECOLOGY

## 2023-01-05 PROCEDURE — 86780 TREPONEMA PALLIDUM: CPT | Performed by: OBSTETRICS & GYNECOLOGY

## 2023-01-05 PROCEDURE — 85025 COMPLETE CBC W/AUTO DIFF WBC: CPT | Performed by: OBSTETRICS & GYNECOLOGY

## 2023-01-06 LAB
HBV SURFACE AG SERPL QL IA: NORMAL
HCV AB SER DONR QL: NORMAL

## 2023-01-07 LAB — RUBV IGG SERPL IA-ACNC: 6.8 INDEX

## 2023-01-09 ENCOUNTER — TELEPHONE (OUTPATIENT)
Dept: OBSTETRICS AND GYNECOLOGY | Facility: CLINIC | Age: 28
End: 2023-01-09
Payer: MEDICAID

## 2023-01-09 LAB
HGB A MFR BLD ELPH: 97.5 % (ref 96.4–98.8)
HGB A2 MFR BLD ELPH: 2.5 % (ref 1.8–3.2)
HGB F MFR BLD ELPH: 0 % (ref 0–2)
HGB FRACT BLD-IMP: NORMAL
HGB S MFR BLD ELPH: 0 %

## 2023-01-09 NOTE — TELEPHONE ENCOUNTER
----- Message from Bijal Quinteros DO sent at 1/6/2023  5:00 PM EST -----  Notify abnormal pap.  Needs f/u pap after delivery

## 2023-01-13 NOTE — TELEPHONE ENCOUNTER
Patient called and informed of her results and recommendations. She understands to repeat her pap after delivery.

## 2023-01-26 ENCOUNTER — ROUTINE PRENATAL (OUTPATIENT)
Dept: OBSTETRICS AND GYNECOLOGY | Facility: CLINIC | Age: 28
End: 2023-01-26
Payer: COMMERCIAL

## 2023-01-26 VITALS — BODY MASS INDEX: 18.97 KG/M2 | WEIGHT: 100.4 LBS | SYSTOLIC BLOOD PRESSURE: 105 MMHG | DIASTOLIC BLOOD PRESSURE: 69 MMHG

## 2023-01-26 DIAGNOSIS — Z34.90 ENCOUNTER FOR SUPERVISION OF NORMAL PREGNANCY, ANTEPARTUM, UNSPECIFIED GRAVIDITY: Primary | ICD-10-CM

## 2023-01-26 DIAGNOSIS — O09.32 LATE PRENATAL CARE AFFECTING PREGNANCY IN SECOND TRIMESTER: ICD-10-CM

## 2023-01-26 DIAGNOSIS — D50.9 IRON DEFICIENCY ANEMIA DURING PREGNANCY: ICD-10-CM

## 2023-01-26 DIAGNOSIS — O99.019 IRON DEFICIENCY ANEMIA DURING PREGNANCY: ICD-10-CM

## 2023-01-26 LAB
GLUCOSE UR STRIP-MCNC: NEGATIVE MG/DL
PROT UR STRIP-MCNC: ABNORMAL MG/DL

## 2023-01-26 PROCEDURE — 99214 OFFICE O/P EST MOD 30 MIN: CPT | Performed by: OBSTETRICS & GYNECOLOGY

## 2023-01-26 RX ORDER — FERROUS SULFATE 325(65) MG
TABLET ORAL
Qty: 30 TABLET | Refills: 2 | Status: SHIPPED | OUTPATIENT
Start: 2023-01-26

## 2023-01-26 NOTE — PROGRESS NOTES
OB FOLLOW UP  CC- Here for care of pregnancy        Jami Gutierrez is a 27 y.o.  21w1d patient being seen today for her obstetrical follow up visit. Patient reports no complaints.     Her prenatal care is complicated by (and status) :    Patient Active Problem List   Diagnosis   • Menometrorrhagia   • Idiopathic scoliosis and kyphoscoliosis   • Encounter for supervision of normal pregnancy, antepartum   • Iron deficiency anemia during pregnancy   • Late prenatal care affecting pregnancy in second trimester       Flu Status: Declines  Covid Status:    ROS -   Patient Reports : No Problems  Patient Denies: Loss of Fluid and Contractions  Fetal Movement : normal  All other systems reviewed and are negative.       The additional following portions of the patient's history were reviewed and updated as appropriate: allergies, current medications, past family history, past medical history, past social history, past surgical history and problem list.    I have reviewed and agree with the HPI, ROS, and historical information as entered above. Bijal Quinteros, DO    /69   Wt 45.5 kg (100 lb 6.4 oz)   BMI 18.97 kg/m²       EXAM:     FHT: 151 BPM   Uterine Size: size equals dates  Pelvic Exam: No    Urine glucose/protein: See prenatal flowsheet       Assessment and Plan  Diagnoses and all orders for this visit:    1. Encounter for supervision of normal pregnancy, antepartum, unspecified  (Primary)  Overview:  Initial visit:  • PNL:NML  • PAP/GC/CT/Urine culture: > 100k E Coli  • Blood type:O Pos  • Hx of HSV?: No    Genetic testing:    • Considering     Vaccinations:  • COVID:   • Influenza:     24-28 weeks:  • 1hr GCT:  • Hct/Plt:  • Tdap Rx  • Rhogam indicated:      Third Trimester:  • GBS  • Breast pump:    Ultrasound:  • Dating:   • Anatomy: 23:  KENISHA confirmed SIUP + cardiac activity @151. All anatomy seen and appears nml , Vertex, post placenta Grade 1 male  • Follow up:      PLAN:        Orders:  -     POC Urinalysis Dipstick  -     Urine Culture - Urine, Urine, Clean Catch; Future    2. Iron deficiency anemia during pregnancy  -     ferrous sulfate 325 (65 FE) MG tablet; Take one every other day with glass of orange juice  Dispense: 30 tablet; Refill: 2    3. Late prenatal care affecting pregnancy in second trimester       1. Pregnancy at 21w1d  2. Fetal status reassuring.   3. Activity and Exercise discussed.  Return in about 4 weeks (around 2/23/2023).   1 hr gtt with next visit    Bijal Quinteros DO  01/26/2023

## 2023-02-21 ENCOUNTER — ROUTINE PRENATAL (OUTPATIENT)
Dept: OBSTETRICS AND GYNECOLOGY | Facility: CLINIC | Age: 28
End: 2023-02-21

## 2023-02-21 VITALS — DIASTOLIC BLOOD PRESSURE: 76 MMHG | WEIGHT: 103.2 LBS | BODY MASS INDEX: 19.5 KG/M2 | SYSTOLIC BLOOD PRESSURE: 118 MMHG

## 2023-02-21 DIAGNOSIS — Z34.90 ENCOUNTER FOR SUPERVISION OF NORMAL PREGNANCY, ANTEPARTUM, UNSPECIFIED GRAVIDITY: ICD-10-CM

## 2023-02-21 LAB
DEPRECATED RDW RBC AUTO: 36.8 FL (ref 37–54)
ERYTHROCYTE [DISTWIDTH] IN BLOOD BY AUTOMATED COUNT: 11.5 % (ref 12.3–15.4)
GLUCOSE 1H P GLC SERPL-MCNC: 108 MG/DL (ref 65–139)
GLUCOSE BLDC GLUCOMTR-MCNC: 121 MG/DL (ref 70–130)
GLUCOSE UR STRIP-MCNC: ABNORMAL MG/DL
HCT VFR BLD AUTO: 33.3 % (ref 34–46.6)
HGB BLD-MCNC: 10.8 G/DL (ref 12–15.9)
MCH RBC QN AUTO: 28.6 PG (ref 26.6–33)
MCHC RBC AUTO-ENTMCNC: 32.4 G/DL (ref 31.5–35.7)
MCV RBC AUTO: 88.1 FL (ref 79–97)
PLATELET # BLD AUTO: 396 10*3/MM3 (ref 140–450)
PMV BLD AUTO: 10.2 FL (ref 6–12)
PROT UR STRIP-MCNC: ABNORMAL MG/DL
RBC # BLD AUTO: 3.78 10*6/MM3 (ref 3.77–5.28)
WBC NRBC COR # BLD: 7.72 10*3/MM3 (ref 3.4–10.8)

## 2023-02-21 PROCEDURE — 82950 GLUCOSE TEST: CPT | Performed by: OBSTETRICS & GYNECOLOGY

## 2023-02-21 PROCEDURE — 99214 OFFICE O/P EST MOD 30 MIN: CPT | Performed by: OBSTETRICS & GYNECOLOGY

## 2023-02-21 PROCEDURE — 82962 GLUCOSE BLOOD TEST: CPT | Performed by: OBSTETRICS & GYNECOLOGY

## 2023-02-21 PROCEDURE — 87077 CULTURE AEROBIC IDENTIFY: CPT | Performed by: OBSTETRICS & GYNECOLOGY

## 2023-02-21 PROCEDURE — 87086 URINE CULTURE/COLONY COUNT: CPT | Performed by: OBSTETRICS & GYNECOLOGY

## 2023-02-21 PROCEDURE — 85027 COMPLETE CBC AUTOMATED: CPT | Performed by: OBSTETRICS & GYNECOLOGY

## 2023-02-21 PROCEDURE — 87186 SC STD MICRODIL/AGAR DIL: CPT | Performed by: OBSTETRICS & GYNECOLOGY

## 2023-02-21 NOTE — PROGRESS NOTES
OB FOLLOW UP  CC- Here for care of pregnancy        Jami Gutierrez is a 27 y.o.  24w6d patient being seen today for her obstetrical follow up visit. Patient reports no complaints.     Her prenatal care is complicated by (and status) :    Patient Active Problem List   Diagnosis   • Menometrorrhagia   • Idiopathic scoliosis and kyphoscoliosis   • Encounter for supervision of normal pregnancy, antepartum   • Iron deficiency anemia during pregnancy   • Late prenatal care affecting pregnancy in second trimester       Flu Status: Declines  Covid Status:    ROS -   Patient Reports : No Problems  Patient Denies: Loss of Fluid and Vaginal Spotting  Fetal Movement : normal  All other systems reviewed and are negative.       The additional following portions of the patient's history were reviewed and updated as appropriate: allergies, current medications, past family history, past medical history, past social history, past surgical history and problem list.    I have reviewed and agree with the HPI, ROS, and historical information as entered above. Bijal Quinteros, DO    /76   Wt 46.8 kg (103 lb 3.2 oz)   BMI 19.50 kg/m²       EXAM:     FHT: 152 BPM   Uterine Size: size equals dates  Pelvic Exam: No    Urine glucose/protein: See prenatal flowsheet       Assessment and Plan  Diagnoses and all orders for this visit:    1. Encounter for supervision of normal pregnancy, antepartum, unspecified   Overview:  Initial visit:  • PNL:NML  • PAP/GC/CT/Urine culture: > 100k E Coli  • Blood type:O Pos  • Hx of HSV?: No    Genetic testing:    • Considering     Vaccinations:  • COVID:   • Influenza:     24-28 weeks:  • 1hr GCT:  • Hct/Plt:  • Tdap Rx  • Rhogam indicated:      Third Trimester:  • GBS  • Breast pump:    Ultrasound:  • Dating:   • Anatomy: 23:  KENISHA confirmed SIUP + cardiac activity @151. All anatomy seen and appears nml , Vertex, post placenta Grade 1 male  • Follow up:      PLAN:        Orders:  -     POC Urinalysis Dipstick  -     CBC (No Diff); Future  -     Gestational Diabetes Screen 1 Hour; Future  -     POC Glucose Fingerstick  -     Urine Culture - Urine, Urine, Clean Catch  -     CBC (No Diff)  -     Gestational Diabetes Screen 1 Hour         1. Pregnancy at 24w6d  2. Fetal status reassuring.   3. Activity and Exercise discussed.  4. Return in about 4 weeks (around 3/21/2023).   5. 1 hr gtt today    Bijal Quinteros DO  02/21/2023

## 2023-02-23 LAB — BACTERIA SPEC AEROBE CULT: ABNORMAL

## 2023-02-27 RX ORDER — CEPHALEXIN 500 MG/1
500 CAPSULE ORAL 3 TIMES DAILY
Qty: 21 CAPSULE | Refills: 0 | Status: SHIPPED | OUTPATIENT
Start: 2023-02-27 | End: 2023-03-06

## 2023-03-20 ENCOUNTER — TELEPHONE (OUTPATIENT)
Dept: OBSTETRICS AND GYNECOLOGY | Facility: CLINIC | Age: 28
End: 2023-03-20

## 2023-03-20 NOTE — TELEPHONE ENCOUNTER
Caller: Jami Gutierrez    Relationship: Self    Best call back number: [phone]    What was the call regarding: PT CALLED IN AND STATED SHE HAS BEEN HAVING PAINS. UNSURE IF SHE SHOULD BE SEEN PRIOR TO 03/23 APPT.     Do you require a callback: YES

## 2023-03-22 ENCOUNTER — TELEPHONE (OUTPATIENT)
Dept: OBSTETRICS AND GYNECOLOGY | Facility: CLINIC | Age: 28
End: 2023-03-22

## 2023-03-22 NOTE — TELEPHONE ENCOUNTER
Caller: Jami Gutierrez    Relationship to patient: Self    Best call back number: [phone]      PT IS UNSURE IF HER WATER BROKE, PT STATES SHE FELT LIKE SHE NEEDED TO USE THE BATHROOM AND HER CLOTHES WERE SOAKING WET.  CLEAR LIQUID, NO BLOOD, OR PAIN.  PT STATES SHE IS FEELING FETAL MOVEMENTS. PLEASE CALL HER AT ANYTIME AND CAN LVM.  THANK YOU.      UNABLE TO WT CALL.

## 2023-03-23 ENCOUNTER — ROUTINE PRENATAL (OUTPATIENT)
Dept: OBSTETRICS AND GYNECOLOGY | Facility: CLINIC | Age: 28
End: 2023-03-23
Payer: COMMERCIAL

## 2023-03-23 VITALS — WEIGHT: 106.6 LBS | DIASTOLIC BLOOD PRESSURE: 74 MMHG | SYSTOLIC BLOOD PRESSURE: 113 MMHG | BODY MASS INDEX: 20.14 KG/M2

## 2023-03-23 DIAGNOSIS — Z34.90 ENCOUNTER FOR SUPERVISION OF NORMAL PREGNANCY, ANTEPARTUM, UNSPECIFIED GRAVIDITY: ICD-10-CM

## 2023-03-23 LAB
GLUCOSE UR STRIP-MCNC: NEGATIVE MG/DL
PROT UR STRIP-MCNC: NEGATIVE MG/DL

## 2023-03-23 PROCEDURE — 87086 URINE CULTURE/COLONY COUNT: CPT | Performed by: OBSTETRICS & GYNECOLOGY

## 2023-03-23 PROCEDURE — 87077 CULTURE AEROBIC IDENTIFY: CPT | Performed by: OBSTETRICS & GYNECOLOGY

## 2023-03-23 PROCEDURE — 87186 SC STD MICRODIL/AGAR DIL: CPT | Performed by: OBSTETRICS & GYNECOLOGY

## 2023-03-23 RX ORDER — CEPHALEXIN 500 MG/1
500 CAPSULE ORAL
COMMUNITY
Start: 2023-03-22 | End: 2023-03-23

## 2023-03-23 RX ORDER — METRONIDAZOLE 500 MG/1
500 TABLET ORAL
COMMUNITY
Start: 2023-03-22 | End: 2023-04-06

## 2023-03-23 NOTE — PROGRESS NOTES
OB FOLLOW UP  CC- Here for care of pregnancy        Jami Gutierrez is a 27 y.o.  29w1d patient being seen today for her obstetrical follow up visit. Patient reports no complaints and tired.     Her prenatal care is complicated by (and status) :    Patient Active Problem List   Diagnosis   • Menometrorrhagia   • Idiopathic scoliosis and kyphoscoliosis   • Encounter for supervision of normal pregnancy, antepartum   • Iron deficiency anemia during pregnancy   • Late prenatal care affecting pregnancy in second trimester       Flu Status: Declines  Covid Status:    ROS -   Patient Reports : No Problems  Patient Denies: Loss of Fluid and Vaginal Spotting  Fetal Movement : normal  All other systems reviewed and are negative.       The additional following portions of the patient's history were reviewed and updated as appropriate: allergies, current medications, past family history, past medical history, past social history, past surgical history and problem list.    I have reviewed and agree with the HPI, ROS, and historical information as entered above. Bijal Quinteros, DO    /74   Wt 48.4 kg (106 lb 9.6 oz)   BMI 20.14 kg/m²       EXAM:     FHT: 150 BPM   Uterine Size: 27 cm  Pelvic Exam: No    Urine glucose/protein: See prenatal flowsheet       Assessment and Plan  Diagnoses and all orders for this visit:    1. Encounter for supervision of normal pregnancy, antepartum, unspecified  (Primary)  Overview:  Initial visit:  • PNL:NML  • PAP/GC/CT/Urine culture: > 100k E Coli  • Blood type:O Pos  • Hx of HSV?: No    Genetic testing:    • Considering     Vaccinations:  • COVID:   • Influenza:     24-28 weeks:  • 1hr GCT:  • Hct/Plt:  • Tdap Rx  • Rhogam indicated:      Third Trimester:  • GBS  • Breast pump:    Ultrasound:  • Dating:   • Anatomy: 23:  KENISHA confirmed SIUP + cardiac activity @151. All anatomy seen and appears nml , Vertex, post placenta Grade 1 male  • Follow up:      PLAN:        Orders:  -     POC Urinalysis Dipstick         1. Pregnancy at 29w1d  2. Fetal status reassuring.   3. Activity and Exercise discussed.  4. Return in about 2 weeks (around 4/6/2023).    Bijal Quinteros,   03/23/2023

## 2023-03-25 LAB — BACTERIA SPEC AEROBE CULT: ABNORMAL

## 2023-03-27 ENCOUNTER — TELEPHONE (OUTPATIENT)
Dept: OBSTETRICS AND GYNECOLOGY | Facility: CLINIC | Age: 28
End: 2023-03-27
Payer: COMMERCIAL

## 2023-03-27 RX ORDER — NITROFURANTOIN 25; 75 MG/1; MG/1
100 CAPSULE ORAL 2 TIMES DAILY
Qty: 10 CAPSULE | Refills: 0 | Status: SHIPPED | OUTPATIENT
Start: 2023-03-27

## 2023-03-27 NOTE — TELEPHONE ENCOUNTER
----- Message from Bijal Quinteros DO sent at 3/27/2023 10:44 AM EDT -----  Notify uti.  I have sent rx to pharmacy.  Pt needs to repeat ua c&s with next visit or after finishes antibiotics

## 2023-03-28 NOTE — TELEPHONE ENCOUNTER
This was my second attempt to call the patient.  I have left another voicemail message asking her to call the office.

## 2023-03-29 NOTE — TELEPHONE ENCOUNTER
This was my third attempt to call the patient.  She did answer and we discussed her result.  She was notified to complete treatment and that we will retest with a repeat ua and c&s at her next visit on 04/06/23.

## 2023-04-06 ENCOUNTER — ROUTINE PRENATAL (OUTPATIENT)
Dept: OBSTETRICS AND GYNECOLOGY | Facility: CLINIC | Age: 28
End: 2023-04-06
Payer: COMMERCIAL

## 2023-04-06 VITALS — SYSTOLIC BLOOD PRESSURE: 119 MMHG | DIASTOLIC BLOOD PRESSURE: 81 MMHG | BODY MASS INDEX: 21.73 KG/M2 | WEIGHT: 115 LBS

## 2023-04-06 DIAGNOSIS — Z34.90 ENCOUNTER FOR SUPERVISION OF NORMAL PREGNANCY, ANTEPARTUM, UNSPECIFIED GRAVIDITY: ICD-10-CM

## 2023-04-06 LAB
GLUCOSE UR STRIP-MCNC: ABNORMAL MG/DL
PROT UR STRIP-MCNC: NEGATIVE MG/DL

## 2023-04-06 PROCEDURE — 81002 URINALYSIS NONAUTO W/O SCOPE: CPT | Performed by: OBSTETRICS & GYNECOLOGY

## 2023-04-06 PROCEDURE — 87086 URINE CULTURE/COLONY COUNT: CPT | Performed by: OBSTETRICS & GYNECOLOGY

## 2023-04-06 PROCEDURE — 0502F SUBSEQUENT PRENATAL CARE: CPT | Performed by: OBSTETRICS & GYNECOLOGY

## 2023-04-06 PROCEDURE — 87186 SC STD MICRODIL/AGAR DIL: CPT | Performed by: OBSTETRICS & GYNECOLOGY

## 2023-04-06 PROCEDURE — 87077 CULTURE AEROBIC IDENTIFY: CPT | Performed by: OBSTETRICS & GYNECOLOGY

## 2023-04-06 RX ORDER — CEPHALEXIN 500 MG/1
1 CAPSULE ORAL 3 TIMES DAILY
COMMUNITY
Start: 2023-03-23 | End: 2023-04-06

## 2023-04-08 LAB — BACTERIA SPEC AEROBE CULT: ABNORMAL

## 2023-04-11 ENCOUNTER — TELEPHONE (OUTPATIENT)
Dept: OBSTETRICS AND GYNECOLOGY | Facility: CLINIC | Age: 28
End: 2023-04-11
Payer: COMMERCIAL

## 2023-04-11 RX ORDER — CEPHALEXIN 500 MG/1
500 CAPSULE ORAL 3 TIMES DAILY
Qty: 21 CAPSULE | Refills: 0 | Status: SHIPPED | OUTPATIENT
Start: 2023-04-11 | End: 2023-04-18

## 2023-04-11 NOTE — TELEPHONE ENCOUNTER
----- Message from Bijal Quinteros, DO sent at 4/11/2023 10:01 AM EDT -----  Please ensure patient taking antibiotics as directed.  Notify urine culture still positive.  I have sent rx to pharmacy. Will need to repeat ua c&s after finishes antibiotics and go on daily suppression until delivery

## 2023-04-16 ENCOUNTER — HOSPITAL ENCOUNTER (EMERGENCY)
Facility: HOSPITAL | Age: 28
Discharge: LEFT BEFORE DISCHARGE | End: 2023-04-17
Payer: COMMERCIAL

## 2023-04-16 PROCEDURE — 99211 OFF/OP EST MAY X REQ PHY/QHP: CPT

## 2023-04-18 ENCOUNTER — HOSPITAL ENCOUNTER (OUTPATIENT)
Facility: HOSPITAL | Age: 28
Discharge: HOME | End: 2023-04-18
Attending: OBSTETRICS & GYNECOLOGY | Admitting: OBSTETRICS & GYNECOLOGY
Payer: COMMERCIAL

## 2023-04-18 VITALS
RESPIRATION RATE: 16 BRPM | BODY MASS INDEX: 21.71 KG/M2 | HEART RATE: 78 BPM | HEIGHT: 61 IN | SYSTOLIC BLOOD PRESSURE: 110 MMHG | TEMPERATURE: 98.3 F | DIASTOLIC BLOOD PRESSURE: 66 MMHG | WEIGHT: 115 LBS

## 2023-04-18 LAB
BILIRUB BLD-MCNC: NEGATIVE MG/DL
CLARITY, POC: ABNORMAL
COLOR UR: YELLOW
GLUCOSE UR STRIP-MCNC: NEGATIVE MG/DL
KETONES UR QL: NEGATIVE
LEUKOCYTE EST, POC: ABNORMAL
NITRITE UR-MCNC: NEGATIVE MG/ML
PH UR: 8.5 [PH] (ref 5–8)
PROT UR STRIP-MCNC: NEGATIVE MG/DL
RBC # UR STRIP: NEGATIVE /UL
SP GR UR: 1.01 (ref 1–1.03)
UROBILINOGEN UR QL: ABNORMAL

## 2023-04-18 PROCEDURE — 96361 HYDRATE IV INFUSION ADD-ON: CPT

## 2023-04-18 PROCEDURE — 59025 FETAL NON-STRESS TEST: CPT

## 2023-04-18 PROCEDURE — 81002 URINALYSIS NONAUTO W/O SCOPE: CPT | Performed by: OBSTETRICS & GYNECOLOGY

## 2023-04-18 PROCEDURE — 96375 TX/PRO/DX INJ NEW DRUG ADDON: CPT

## 2023-04-18 PROCEDURE — 96374 THER/PROPH/DIAG INJ IV PUSH: CPT

## 2023-04-18 PROCEDURE — 25010000002 DIPHENHYDRAMINE PER 50 MG: Performed by: OBSTETRICS & GYNECOLOGY

## 2023-04-18 PROCEDURE — G0463 HOSPITAL OUTPT CLINIC VISIT: HCPCS

## 2023-04-18 PROCEDURE — 25010000002 PROCHLORPERAZINE 10 MG/2ML SOLUTION: Performed by: OBSTETRICS & GYNECOLOGY

## 2023-04-18 RX ORDER — PROCHLORPERAZINE EDISYLATE 5 MG/ML
10 INJECTION INTRAMUSCULAR; INTRAVENOUS ONCE
Status: COMPLETED | OUTPATIENT
Start: 2023-04-18 | End: 2023-04-18

## 2023-04-18 RX ORDER — DIPHENHYDRAMINE HYDROCHLORIDE 50 MG/ML
50 INJECTION INTRAMUSCULAR; INTRAVENOUS ONCE
Status: COMPLETED | OUTPATIENT
Start: 2023-04-18 | End: 2023-04-18

## 2023-04-18 RX ORDER — PROCHLORPERAZINE EDISYLATE 5 MG/ML
10 INJECTION INTRAMUSCULAR; INTRAVENOUS EVERY 6 HOURS PRN
Status: DISCONTINUED | OUTPATIENT
Start: 2023-04-18 | End: 2023-04-18

## 2023-04-18 RX ADMIN — DIPHENHYDRAMINE HYDROCHLORIDE 50 MG: 50 INJECTION, SOLUTION INTRAMUSCULAR; INTRAVENOUS at 10:07

## 2023-04-18 RX ADMIN — SODIUM CHLORIDE, POTASSIUM CHLORIDE, SODIUM LACTATE AND CALCIUM CHLORIDE 1000 ML: 600; 310; 30; 20 INJECTION, SOLUTION INTRAVENOUS at 09:15

## 2023-04-18 RX ADMIN — PROCHLORPERAZINE EDISYLATE 10 MG: 5 INJECTION INTRAMUSCULAR; INTRAVENOUS at 10:07

## 2023-04-18 NOTE — NON STRESS TEST
"Obstetrical Non-stress Test Interpretation     Name:  Jami Gutierrez  MRN: [id number]    27 y.o. female  at 32w6d    Indication: Headache      Fetal Movement: active  Fetal HR Assessment Method: external  Fetal HR (beats/min): 140  Fetal HR Baseline: normal range  Fetal HR Variability: moderate (amplitude range 6 to 25 bpm)  Fetal HR Accelerations: episodic, greater than/equal to 15 bpm, lasting at least 15 seconds  Fetal HR Decelerations: absent  Sinusoidal Pattern Present: absent      Patient Vitals for the past 24 hrs:   BP Temp Temp src Pulse Resp Height Weight   23 0931 110/66 -- -- 78 -- -- --   23 0916 113/68 -- -- 78 -- -- --   23 0859 125/57 98.3 °F (36.8 °C) Oral 84 16 -- --   23 0854 -- -- -- -- -- 154.9 cm (61\") 52.2 kg (115 lb)       /66 (BP Location: Right arm, Patient Position: Lying)   Pulse 78   Temp 98.3 °F (36.8 °C) (Oral)   Resp 16   Ht 154.9 cm (61\")   Wt 52.2 kg (115 lb)   BMI 21.73 kg/m²     Reason for test: Other (Comment) (headache)  Date of Test: 2023  Time frame of test: 6507-6311  RN NST Interpretation: Reactive      Seda Parkinson RN  2023  09:46 EDT  "

## 2023-04-18 NOTE — H&P
[facility]  Obstetric History and Physical    Chief Complaint   Patient presents with   • Headache       Subjective     HPI:    Patient is a 27 y.o. female  currently at 32w6d, who presents with a HA for the last 3d;  Went to ER in [place] and had morphine and benadryl and steroids;  It got a little better then came back;  No n/v;  +sensitivity to light, no numbness or tingling anywhere or probs with eyesight;  Pt does not get auras but has had a h/o migraines.    Her prenatal care is c/b late [facility].  Her previous obstetric/gynecological history is noted for is non-contributory.    The following portions of the patients history were reviewed and updated as appropriate:   current medications, allergies, past medical history, past surgical history, past family history, past social history and current problem list.     Prenatal Information:  Prenatal Results     POC Urine Glucose/Protein     Test Value Reference Range Date Time    Urine Glucose  1+ mg/dL Negative 23 1517    Urine Protein  Negative mg/dL Negative 23 1517          Initial Prenatal Labs     Test Value Reference Range Date Time    Hemoglobin  11.2 g/dL 12.0 - 15.9 23 1145    Hematocrit  33.2 % 34.0 - 46.6 23 1145    Platelets  386 10*3/mm3 140 - 450 23 1145    Rubella IgG  6.80 index Immune >0.99 23 1145    Hepatitis B SAg  Non-Reactive  Non-Reactive 23 1145    Hepatitis C Ab  Non-Reactive  Non-Reactive 23 1145    RPR        T. Pallidum Ab   Non-Reactive  Non-Reactive 23 1145    ABO  O   23 1145    Rh  Positive   23 1145    Antibody Screen  Negative   23 1145    HIV  Non-Reactive  Non-Reactive 23 1145    Urine Culture  >100,000 CFU/mL Escherichia coli   23 1605       >100,000 CFU/mL Escherichia coli   23 1516       >100,000 CFU/mL Escherichia coli   23 1621       >100,000 CFU/mL Escherichia coli   22 1345    Gonorrhea  Negative  Negative 22  1345    Chlamydia  Negative  Negative 12/22/22 1345    TSH  1.650 uIU/mL 0.270 - 4.200 01/11/22 0911    HgB A1c               2nd and 3rd Trimester     Test Value Reference Range Date Time    Hemoglobin (repeated)  10.8 g/dL 12.0 - 15.9 02/21/23 1607    Hematocrit (repeated)  33.3 % 34.0 - 46.6 02/21/23 1607    Platelets   396 10*3/mm3 140 - 450 02/21/23 1607       386 10*3/mm3 140 - 450 01/05/23 1145    GCT  108 mg/dL 65 - 139 02/21/23 1607    Antibody Screen (repeated)        GTT Fasting        GTT 1 Hr        GTT 2 Hr        GTT 3 Hr        Group B Strep              Drug Screening     Test Value Reference Range Date Time    Amphetamine Screen        Barbiturate Screen        Benzodiazepine Screen        Methadone Screen        Phencyclidine Screen        Opiates Screen        THC Screen        Cocaine Screen        Propoxyphene Screen        Buprenorphine Screen        Methamphetamine Screen        Oxycodone Screen        Tricyclic Antidepressants Screen              Other (Risk screening)     Test Value Reference Range Date Time    Varicella IgG        Parvovirus IgG        CMV IgG        Cystic Fibrosis        Hemoglobin electrophoresis        NIPT        MSAFP-4        AFP (for NTD only)              Legend    ^: Historical                      External Prenatal Results     Pregnancy Outside Results - Transcribed From Office Records - See Scanned Records For Details     Test Value Date Time    ABO  O  01/05/23 1145    Rh  Positive  01/05/23 1145    Antibody Screen  Negative  01/05/23 1145    Varicella IgG       Rubella  6.80 index 01/05/23 1145    Hgb  10.8 g/dL 02/21/23 1607       11.2 g/dL 01/05/23 1145    Hct  33.3 % 02/21/23 1607       33.2 % 01/05/23 1145    Glucose Fasting GTT       Glucose Tolerance Test 1 hour       Glucose Tolerance Test 3 hour       Gonorrhea (discrete)  Negative  12/22/22 1345    Chlamydia (discrete)  Negative  12/22/22 1345    RPR       VDRL       Syphilis Antibody       HBsAg   Non-Reactive  23 1145    Herpes Simplex Virus PCR       Herpes Simplex VIrus Culture       HIV  Non-Reactive  23 1145    Hep C RNA Quant PCR       Hep C Antibody  Non-Reactive  23 1145    AFP       Group B Strep       GBS Susceptibility to Clindamycin       GBS Susceptibility to Erythromycin       Fetal Fibronectin       Genetic Testing, Maternal Blood             Drug Screening     Test Value Date Time    Urine Drug Screen       Amphetamine Screen       Barbiturate Screen       Benzodiazepine Screen       Methadone Screen       Phencyclidine Screen       Opiates Screen       THC Screen       Cocaine Screen       Propoxyphene Screen       Buprenorphine Screen       Methamphetamine Screen       Oxycodone Screen       Tricyclic Antidepressants Screen             Legend    ^: Historical                         Past OB History:     OB History    Para Term  AB Living   2 0 0 0 1 0   SAB IAB Ectopic Molar Multiple Live Births   0 1 0 0 0 0      # Outcome Date GA Lbr Elian/2nd Weight Sex Delivery Anes PTL Lv   2 Current            1 IA2017               Past Medical History: Past Medical History:   Diagnosis Date   • Abnormal uterine bleeding    • Anxiety    • Migraine    • Ovarian cyst    • Urinary tract infection       Past Surgical History History reviewed. No pertinent surgical history.   Family History: Family History   Problem Relation Age of Onset   • Hypertension Father    • Prostate cancer Father    • Hypertension Mother    • Diabetes Maternal Grandmother    • Ovarian cancer Neg Hx    • Uterine cancer Neg Hx    • Breast cancer Neg Hx    • Colon cancer Neg Hx    • Melanoma Neg Hx    • Clotting disorder Neg Hx       Social History:  reports that she has never smoked. She has been exposed to tobacco smoke. She has never used smokeless tobacco.   reports that she does not currently use alcohol.   reports no history of drug use.        General ROS: Pertinent items are noted in HPI  Home  Medications:  cephalexin, ferrous sulfate, and nitrofurantoin (macrocrystal-monohydrate)    Allergies:  Allergies   Allergen Reactions   • Latex Hives   • Penicillins Hives       Objective       Vital Signs Range for the last 24 hours  Temperature: Temp:  [98.3 °F (36.8 °C)] 98.3 °F (36.8 °C)   Temp Source: Temp src: Oral   BP: BP: (125)/(57) 125/57   Pulse: Heart Rate:  [84] 84   Respirations: Resp:  [16] 16   SPO2:       Physical Examination:   General appearance - alert, well appearing, and in no distress  Mental status - alert, oriented to person, place, and time  Chest - clear to auscultation  Heart - normal rate, regular rhythm,  Abdomen - soft, nontender, nondistended,   Pelvic - normal external genitalia, vulva, vagina, cervix, and uterus   Back exam - full range of motion, no tenderness or pain on motion  Neurological - alert, oriented, normal speech  Extremities - peripheral pulses normal  Skin - normal coloration and turgor, no suspicious skin lesions noted    Presentation: unknown   Cervix:     Dilation:     Effacement:     Station:         Fetal Heart Rate Assessment :  140s GLTV, no decels, cat 1, AGA  Method:     Beats/min:     Baseline:     Variability:     Accels:     Decels:     Tracing Category:       Uterine Assessment :  quiet  Method:     Frequency (min):     Ctx Count in 10 min:     Duration:     Intensity:     [place] Units:       GBS is unknown     Assessment & Plan       * No active hospital problems. *        Assessment:  1.  Intrauterine pregnancy at 32w6d gestation with reassuring fetal status.    2.  HA  3.  Obstetrical history significant for is non-contributory.  4.  GBS status: No results found for: STREPGPB    Plan:  1. Will give IV fluids, benadryl and compazine  2.  Plan of care has been reviewed with patient and patient agrees.   3.  Risks, benefits of treatment plan have been discussed.  4.  All questions have been answered.        Electronically signed by Jeri Ariza  MD, 04/18/23, 9:08 AM EDT.

## 2023-04-18 NOTE — NURSING NOTE
At patient's bedside, discharge instructions discussed with patient. Advised patient to keep next scheduled office visit. All questions answered. Patient verbalized understanding of all and left unit via wheelchair in stable condition accompanied by her mother.

## 2023-04-19 ENCOUNTER — TELEPHONE (OUTPATIENT)
Dept: OBSTETRICS AND GYNECOLOGY | Facility: CLINIC | Age: 28
End: 2023-04-19

## 2023-04-19 ENCOUNTER — ROUTINE PRENATAL (OUTPATIENT)
Dept: OBSTETRICS AND GYNECOLOGY | Facility: CLINIC | Age: 28
End: 2023-04-19
Payer: COMMERCIAL

## 2023-04-19 VITALS — BODY MASS INDEX: 21.24 KG/M2 | SYSTOLIC BLOOD PRESSURE: 121 MMHG | WEIGHT: 112.4 LBS | DIASTOLIC BLOOD PRESSURE: 76 MMHG

## 2023-04-19 DIAGNOSIS — O26.849 FETAL SIZE INCONSISTENT WITH DATES: ICD-10-CM

## 2023-04-19 DIAGNOSIS — D50.9 IRON DEFICIENCY ANEMIA DURING PREGNANCY: ICD-10-CM

## 2023-04-19 DIAGNOSIS — R51.9 ACUTE INTRACTABLE HEADACHE, UNSPECIFIED HEADACHE TYPE: ICD-10-CM

## 2023-04-19 DIAGNOSIS — O12.13 PROTEINURIA AFFECTING PREGNANCY IN THIRD TRIMESTER: ICD-10-CM

## 2023-04-19 DIAGNOSIS — Z34.90 ENCOUNTER FOR SUPERVISION OF NORMAL PREGNANCY, ANTEPARTUM, UNSPECIFIED GRAVIDITY: Primary | ICD-10-CM

## 2023-04-19 DIAGNOSIS — O09.30 LATE PRENATAL CARE AFFECTING PREGNANCY, ANTEPARTUM: ICD-10-CM

## 2023-04-19 DIAGNOSIS — O99.019 IRON DEFICIENCY ANEMIA DURING PREGNANCY: ICD-10-CM

## 2023-04-19 LAB
ALBUMIN SERPL-MCNC: 3.9 G/DL (ref 3.5–5.2)
ALBUMIN/GLOB SERPL: 1.3 G/DL
ALP SERPL-CCNC: 82 U/L (ref 39–117)
ALT SERPL W P-5'-P-CCNC: 28 U/L (ref 1–33)
ANION GAP SERPL CALCULATED.3IONS-SCNC: 11 MMOL/L (ref 5–15)
AST SERPL-CCNC: 25 U/L (ref 1–32)
BILIRUB SERPL-MCNC: 0.3 MG/DL (ref 0–1.2)
BUN SERPL-MCNC: 5 MG/DL (ref 6–20)
BUN/CREAT SERPL: 7.1 (ref 7–25)
CALCIUM SPEC-SCNC: 8.9 MG/DL (ref 8.6–10.5)
CHLORIDE SERPL-SCNC: 104 MMOL/L (ref 98–107)
CO2 SERPL-SCNC: 22 MMOL/L (ref 22–29)
CREAT SERPL-MCNC: 0.7 MG/DL (ref 0.57–1)
CREAT UR-MCNC: 142.4 MG/DL
DEPRECATED RDW RBC AUTO: 38.2 FL (ref 37–54)
EGFRCR SERPLBLD CKD-EPI 2021: 122.5 ML/MIN/1.73
ERYTHROCYTE [DISTWIDTH] IN BLOOD BY AUTOMATED COUNT: 11.8 % (ref 12.3–15.4)
GLOBULIN UR ELPH-MCNC: 3.1 GM/DL
GLUCOSE SERPL-MCNC: 61 MG/DL (ref 65–99)
GLUCOSE UR STRIP-MCNC: ABNORMAL MG/DL
HCT VFR BLD AUTO: 32.1 % (ref 34–46.6)
HGB BLD-MCNC: 10.8 G/DL (ref 12–15.9)
MCH RBC QN AUTO: 29.4 PG (ref 26.6–33)
MCHC RBC AUTO-ENTMCNC: 33.6 G/DL (ref 31.5–35.7)
MCV RBC AUTO: 87.5 FL (ref 79–97)
PLATELET # BLD AUTO: 385 10*3/MM3 (ref 140–450)
PMV BLD AUTO: 10.2 FL (ref 6–12)
POTASSIUM SERPL-SCNC: 3.6 MMOL/L (ref 3.5–5.2)
PROT ?TM UR-MCNC: 18.8 MG/DL
PROT SERPL-MCNC: 7 G/DL (ref 6–8.5)
PROT UR STRIP-MCNC: ABNORMAL MG/DL
PROT/CREAT UR: 0.13 MG/G{CREAT}
RBC # BLD AUTO: 3.67 10*6/MM3 (ref 3.77–5.28)
SODIUM SERPL-SCNC: 137 MMOL/L (ref 136–145)
WBC NRBC COR # BLD: 8.39 10*3/MM3 (ref 3.4–10.8)

## 2023-04-19 PROCEDURE — 85027 COMPLETE CBC AUTOMATED: CPT | Performed by: OBSTETRICS & GYNECOLOGY

## 2023-04-19 PROCEDURE — 80053 COMPREHEN METABOLIC PANEL: CPT | Performed by: OBSTETRICS & GYNECOLOGY

## 2023-04-19 PROCEDURE — 81002 URINALYSIS NONAUTO W/O SCOPE: CPT | Performed by: OBSTETRICS & GYNECOLOGY

## 2023-04-19 PROCEDURE — 0502F SUBSEQUENT PRENATAL CARE: CPT | Performed by: OBSTETRICS & GYNECOLOGY

## 2023-04-19 PROCEDURE — 82570 ASSAY OF URINE CREATININE: CPT | Performed by: OBSTETRICS & GYNECOLOGY

## 2023-04-19 PROCEDURE — 84156 ASSAY OF PROTEIN URINE: CPT | Performed by: OBSTETRICS & GYNECOLOGY

## 2023-04-19 RX ORDER — BUTALBITAL, ACETAMINOPHEN AND CAFFEINE 300; 40; 50 MG/1; MG/1; MG/1
2 CAPSULE ORAL EVERY 6 HOURS PRN
Qty: 12 CAPSULE | Refills: 0 | Status: SHIPPED | OUTPATIENT
Start: 2023-04-19

## 2023-04-19 RX ORDER — CETIRIZINE HYDROCHLORIDE 10 MG/1
10 TABLET ORAL DAILY
Qty: 30 TABLET | Refills: 1 | Status: SHIPPED | OUTPATIENT
Start: 2023-04-19

## 2023-04-19 NOTE — ASSESSMENT & PLAN NOTE
The patient's blood pressure is normal so I am not highly concerned about preeclampsia.  She does have protein in her urine so we will perform a thorough evaluation.  Plan to check a CBC, CMP and urine PC ratio.  We will try Fioricet to see if this will help her headache.  Given the time of year, there is significant allergens present in the air so this could also be contribute to the patient's headaches will start the patient on a daily dose of Zyrtec to see if that will help.  Also encourage p.o. hydration.  Preeclampsia warnings were discussed.

## 2023-04-19 NOTE — PROGRESS NOTES
OB FOLLOW UP    CC: Scheduled OB routine FU     Prenatal care complicated by:   Patient Active Problem List   Diagnosis   • Menometrorrhagia   • Idiopathic scoliosis and kyphoscoliosis   • Encounter for supervision of normal pregnancy, antepartum   • Iron deficiency anemia during pregnancy   • Late prenatal care affecting pregnancy, antepartum   • Fetal size inconsistent with dates   • Acute intractable headache   • Proteinuria affecting pregnancy in third trimester       Subjective:   Patient has: No leaking fluid, No vaginal bleeding, Adequate FM  The patient reports that she has had a headache since .  Unrelieved with Tylenol.  She was seen with [facility].  She received pain medications at both location with no improvement in her symptoms.  She is also complaining now of contractions.  At the worst about every 10 minutes apart.  She is also feeling pelvic pressure.  She denies any vision changes, right upper quadrant or epigastric pain.  Her blood pressure has never been abnormal.    Objective:  Urine glucose/protein- see flow sheet      /76   Wt 51 kg (112 lb 6.4 oz)   BMI 21.24 kg/m²   See OB flow for LE edema, and cvx exam if applicable  FHT: 153 BPM   Uterine Size: 29 cm      Assessment and Plan:  Diagnoses and all orders for this visit:    1. Encounter for supervision of normal pregnancy, antepartum, unspecified  (Primary)  Overview:    Assessment & Plan:  Continue prenatal vitamins  Fetal kick counts   labor warnings    Orders:  -     POC Urinalysis Dipstick    2. Proteinuria affecting pregnancy in third trimester  -     Urine Culture - Urine, Urine, Clean Catch  -     CBC (No Diff)  -     Comprehensive Metabolic Panel  -     Protein / Creatinine Ratio, Urine - Urine, Clean Catch    3. Acute intractable headache, unspecified headache type  Assessment & Plan:  The patient's blood pressure is normal so I am not highly concerned about preeclampsia.  She does have  protein in her urine so we will perform a thorough evaluation.  Plan to check a CBC, CMP and urine PC ratio.  We will try Fioricet to see if this will help her headache.  Given the time of year, there is significant allergens present in the air so this could also be contribute to the patient's headaches will start the patient on a daily dose of Zyrtec to see if that will help.  Also encourage p.o. hydration.  Preeclampsia warnings were discussed.    Orders:  -     cetirizine (zyrTEC) 10 MG tablet; Take 1 tablet by mouth Daily.  Dispense: 30 tablet; Refill: 1  -     butalbital-acetaminophen-caffeine (Fioricet) -40 MG capsule capsule; Take 2 capsules by mouth Every 6 (Six) Hours As Needed (headache).  Dispense: 12 capsule; Refill: 0    4. Fetal size inconsistent with dates    5. Iron deficiency anemia during pregnancy  Assessment & Plan:  Continue ferrous sulfate      6. Late prenatal care affecting pregnancy, antepartum        33w0d  Reassuring pregnancy progress    Counseling: OB precautions, leaking, VB, chetna valentin vs PTL/Labor  FKC  HTN precautions reviewed: HA, vision change, RUQ/epigastric pain, edema    Questions answered    Return in about 1 week (around 4/26/2023) for Recheck.      Joseluis Anthony MD  04/19/2023

## 2023-04-19 NOTE — TELEPHONE ENCOUNTER
Patient called with concerns of a headache since Friday and having what she thinks is chetna valentin since about 2 AM.Pt was seen at [facility] for the headache on 04/17 and at [facility] yesterday. She is scheduled for an appt tomorrow but wanted to see if she could come in today. Please advise.

## 2023-04-23 ENCOUNTER — APPOINTMENT (OUTPATIENT)
Dept: GENERAL RADIOLOGY | Facility: HOSPITAL | Age: 28
End: 2023-04-23
Payer: COMMERCIAL

## 2023-04-23 ENCOUNTER — HOSPITAL ENCOUNTER (OUTPATIENT)
Facility: HOSPITAL | Age: 28
Discharge: HOME | End: 2023-04-24
Attending: OBSTETRICS & GYNECOLOGY | Admitting: OBSTETRICS & GYNECOLOGY
Payer: COMMERCIAL

## 2023-04-23 ENCOUNTER — HOSPITAL ENCOUNTER (EMERGENCY)
Facility: HOSPITAL | Age: 28
Discharge: LEFT BEFORE DISCHARGE | End: 2023-04-23
Payer: COMMERCIAL

## 2023-04-23 VITALS
DIASTOLIC BLOOD PRESSURE: 69 MMHG | HEART RATE: 97 BPM | SYSTOLIC BLOOD PRESSURE: 120 MMHG | TEMPERATURE: 97.7 F | WEIGHT: 109.79 LBS | RESPIRATION RATE: 18 BRPM | HEIGHT: 61 IN | BODY MASS INDEX: 20.73 KG/M2 | OXYGEN SATURATION: 100 %

## 2023-04-23 LAB
ALBUMIN SERPL-MCNC: 3.9 G/DL (ref 3.5–5.2)
ALBUMIN/GLOB SERPL: 1.2 G/DL
ALP SERPL-CCNC: 92 U/L (ref 39–117)
ALT SERPL W P-5'-P-CCNC: 18 U/L (ref 1–33)
ANION GAP SERPL CALCULATED.3IONS-SCNC: 12.6 MMOL/L (ref 5–15)
AST SERPL-CCNC: 19 U/L (ref 1–32)
BASOPHILS # BLD AUTO: 0.05 10*3/MM3 (ref 0–0.2)
BASOPHILS NFR BLD AUTO: 0.8 % (ref 0–1.5)
BILIRUB SERPL-MCNC: 0.4 MG/DL (ref 0–1.2)
BUN SERPL-MCNC: 4 MG/DL (ref 6–20)
BUN/CREAT SERPL: 6.9 (ref 7–25)
CALCIUM SPEC-SCNC: 9.7 MG/DL (ref 8.6–10.5)
CHLORIDE SERPL-SCNC: 101 MMOL/L (ref 98–107)
CO2 SERPL-SCNC: 22.4 MMOL/L (ref 22–29)
CREAT SERPL-MCNC: 0.58 MG/DL (ref 0.57–1)
DEPRECATED RDW RBC AUTO: 40.1 FL (ref 37–54)
EGFRCR SERPLBLD CKD-EPI 2021: 128.2 ML/MIN/1.73
EOSINOPHIL # BLD AUTO: 0.04 10*3/MM3 (ref 0–0.4)
EOSINOPHIL NFR BLD AUTO: 0.6 % (ref 0.3–6.2)
ERYTHROCYTE [DISTWIDTH] IN BLOOD BY AUTOMATED COUNT: 12.6 % (ref 12.3–15.4)
GLOBULIN UR ELPH-MCNC: 3.3 GM/DL
GLUCOSE SERPL-MCNC: 128 MG/DL (ref 65–99)
GLUCOSE UR STRIP-MCNC: ABNORMAL MG/DL
HCT VFR BLD AUTO: 33.7 % (ref 34–46.6)
HGB BLD-MCNC: 11 G/DL (ref 12–15.9)
HOLD SPECIMEN: NORMAL
HOLD SPECIMEN: NORMAL
IMM GRANULOCYTES # BLD AUTO: 0.04 10*3/MM3 (ref 0–0.05)
IMM GRANULOCYTES NFR BLD AUTO: 0.6 % (ref 0–0.5)
KETONES UR QL: ABNORMAL
LIPASE SERPL-CCNC: 18 U/L (ref 13–60)
LYMPHOCYTES # BLD AUTO: 1.66 10*3/MM3 (ref 0.7–3.1)
LYMPHOCYTES NFR BLD AUTO: 25.1 % (ref 19.6–45.3)
MAGNESIUM SERPL-MCNC: 1.7 MG/DL (ref 1.6–2.6)
MCH RBC QN AUTO: 28.7 PG (ref 26.6–33)
MCHC RBC AUTO-ENTMCNC: 32.6 G/DL (ref 31.5–35.7)
MCV RBC AUTO: 88 FL (ref 79–97)
MONOCYTES # BLD AUTO: 0.47 10*3/MM3 (ref 0.1–0.9)
MONOCYTES NFR BLD AUTO: 7.1 % (ref 5–12)
NEUTROPHILS NFR BLD AUTO: 4.36 10*3/MM3 (ref 1.7–7)
NEUTROPHILS NFR BLD AUTO: 65.8 % (ref 42.7–76)
NRBC BLD AUTO-RTO: 0 /100 WBC (ref 0–0.2)
NT-PROBNP SERPL-MCNC: <36 PG/ML (ref 0–450)
PLATELET # BLD AUTO: 382 10*3/MM3 (ref 140–450)
PMV BLD AUTO: 9.7 FL (ref 6–12)
POTASSIUM SERPL-SCNC: 3.1 MMOL/L (ref 3.5–5.2)
PROT SERPL-MCNC: 7.2 G/DL (ref 6–8.5)
PROT UR STRIP-MCNC: ABNORMAL MG/DL
QT INTERVAL: 346 MS
RBC # BLD AUTO: 3.83 10*6/MM3 (ref 3.77–5.28)
SODIUM SERPL-SCNC: 136 MMOL/L (ref 136–145)
TROPONIN T SERPL HS-MCNC: <6 NG/L
WBC NRBC COR # BLD: 6.62 10*3/MM3 (ref 3.4–10.8)
WHOLE BLOOD HOLD COAG: NORMAL
WHOLE BLOOD HOLD SPECIMEN: NORMAL

## 2023-04-23 PROCEDURE — 81002 URINALYSIS NONAUTO W/O SCOPE: CPT | Performed by: OBSTETRICS & GYNECOLOGY

## 2023-04-23 PROCEDURE — 99211 OFF/OP EST MAY X REQ PHY/QHP: CPT

## 2023-04-23 PROCEDURE — 93005 ELECTROCARDIOGRAM TRACING: CPT

## 2023-04-23 PROCEDURE — 85025 COMPLETE CBC W/AUTO DIFF WBC: CPT

## 2023-04-23 PROCEDURE — 80053 COMPREHEN METABOLIC PANEL: CPT

## 2023-04-23 PROCEDURE — 83690 ASSAY OF LIPASE: CPT

## 2023-04-23 PROCEDURE — 83735 ASSAY OF MAGNESIUM: CPT

## 2023-04-23 PROCEDURE — 83880 ASSAY OF NATRIURETIC PEPTIDE: CPT

## 2023-04-23 PROCEDURE — 84484 ASSAY OF TROPONIN QUANT: CPT

## 2023-04-23 RX ORDER — SODIUM CHLORIDE 0.9 % (FLUSH) 0.9 %
10 SYRINGE (ML) INJECTION AS NEEDED
Status: DISCONTINUED | OUTPATIENT
Start: 2023-04-23 | End: 2023-04-23 | Stop reason: HOSPADM

## 2023-04-24 VITALS
SYSTOLIC BLOOD PRESSURE: 124 MMHG | RESPIRATION RATE: 18 BRPM | TEMPERATURE: 98.1 F | HEART RATE: 93 BPM | DIASTOLIC BLOOD PRESSURE: 58 MMHG | OXYGEN SATURATION: 100 %

## 2023-04-24 PROCEDURE — 59025 FETAL NON-STRESS TEST: CPT

## 2023-04-24 PROCEDURE — G0463 HOSPITAL OUTPT CLINIC VISIT: HCPCS

## 2023-04-24 NOTE — NON STRESS TEST
Obstetrical Non-stress Test Interpretation     Name:  Jami Gutierrez  MRN: [id number]    27 y.o. female  at 33w5d    Indication:low back pain      Fetal Assessment  Fetal Movement: active  Fetal HR Assessment Method: external  Fetal HR (beats/min): 130  Fetal HR Baseline: normal range  Fetal HR Variability: moderate (amplitude range 6 to 25 bpm)  Fetal HR Accelerations: episodic, greater than/equal to 15 bpm, lasting at least 15 seconds  Fetal HR Decelerations: absent  Sinusoidal Pattern Present: absent    /58 (BP Location: Right arm, Patient Position: Sitting)   Pulse 90   Temp 98.1 °F (36.7 °C) (Oral)   Resp 18   SpO2 100%     Reason for test:    Date of Test: 2023  Time frame of test:   RN NST Interpretation: Reactive      Deborah Sky RN  2023  00:32 EDT

## 2023-04-24 NOTE — NURSING NOTE
at 33w4d presents to L&D with complaints of lower back pain. Placed on US and Tok. Abdomen palpates soft&non-tender. Back painX1 day. VS WNL. SVE 0-1/thick/high. Reports positive fetal movement and no leaking of fluid or vaginal bleeding.

## 2023-04-24 NOTE — OBED NOTES
TRACY Henderson  Obstetric History and Physical    Chief Complaint   Patient presents with   • Back Pain       Subjective     Patient is a 27 y.o. female  currently at 33w5d, who presents with complaint of lower abdominal pain.  No vaginal bleeding.  No fever or chills.  No headache visual disturbance or right upper quadrant pain.    Her prenatal care is benign.  Her previous obstetric/gynecological history is noted for is non-contributory.    The following portions of the patients history were reviewed and updated as appropriate: current medications, allergies, past medical history, past surgical history, past family history and past social history .       Prenatal Information:  Prenatal Results     POC Urine Glucose/Protein     Test Value Reference Range Date Time    Urine Glucose  500 mg/dL mg/dL Negative 23    Urine Protein  Trace mg/dL Negative 23          Initial Prenatal Labs     Test Value Reference Range Date Time    Hemoglobin  11.2 g/dL 12.0 - 15.9 23 1145    Hematocrit  33.2 % 34.0 - 46.6 23 1145    Platelets  386 10*3/mm3 140 - 450 23 1145    Rubella IgG  6.80 index Immune >0.99 23 1145    Hepatitis B SAg  Non-Reactive  Non-Reactive 23 1145    Hepatitis C Ab  Non-Reactive  Non-Reactive 23 1145    RPR        T. Pallidum Ab   Non-Reactive  Non-Reactive 23 1145    ABO  O   23 1145    Rh  Positive   23 1145    Antibody Screen  Negative   23 1145    HIV  Non-Reactive  Non-Reactive 23 1145    Urine Culture  >100,000 CFU/mL Escherichia coli   23 1605       >100,000 CFU/mL Escherichia coli   23 1516       >100,000 CFU/mL Escherichia coli   23 1621       >100,000 CFU/mL Escherichia coli   22 1345    Gonorrhea  Negative  Negative 22 1345    Chlamydia  Negative  Negative 22 1345    TSH  1.650 uIU/mL 0.270 - 4.200 22 0911    HgB A1c               2nd and 3rd Trimester     Test Value  Reference Range Date Time    Hemoglobin (repeated)  11.0 g/dL 12.0 - 15.9 04/23/23 2146       10.8 g/dL 12.0 - 15.9 04/19/23 1354       10.8 g/dL 12.0 - 15.9 02/21/23 1607    Hematocrit (repeated)  33.7 % 34.0 - 46.6 04/23/23 2146       32.1 % 34.0 - 46.6 04/19/23 1354       33.3 % 34.0 - 46.6 02/21/23 1607    Platelets   382 10*3/mm3 140 - 450 04/23/23 2146       385 10*3/mm3 140 - 450 04/19/23 1354       396 10*3/mm3 140 - 450 02/21/23 1607       386 10*3/mm3 140 - 450 01/05/23 1145    GCT  108 mg/dL 65 - 139 02/21/23 1607    Antibody Screen (repeated)        GTT Fasting        GTT 1 Hr        GTT 2 Hr        GTT 3 Hr        Group B Strep              Drug Screening     Test Value Reference Range Date Time    Amphetamine Screen        Barbiturate Screen        Benzodiazepine Screen        Methadone Screen        Phencyclidine Screen        Opiates Screen        THC Screen        Cocaine Screen        Propoxyphene Screen        Buprenorphine Screen        Methamphetamine Screen        Oxycodone Screen        Tricyclic Antidepressants Screen              Other (Risk screening)     Test Value Reference Range Date Time    Varicella IgG        Parvovirus IgG        CMV IgG        Cystic Fibrosis        Hemoglobin electrophoresis        NIPT        MSAFP-4        AFP (for NTD only)              Legend    ^: Historical                      External Prenatal Results     Pregnancy Outside Results - Transcribed From Office Records - See Scanned Records For Details     Test Value Date Time    ABO  O  01/05/23 1145    Rh  Positive  01/05/23 1145    Antibody Screen  Negative  01/05/23 1145    Varicella IgG       Rubella  6.80 index 01/05/23 1145    Hgb  11.0 g/dL 04/23/23 2146       10.8 g/dL 04/19/23 1354       10.8 g/dL 02/21/23 1607       11.2 g/dL 01/05/23 1145    Hct  33.7 % 04/23/23 2146       32.1 % 04/19/23 1354       33.3 % 02/21/23 1607       33.2 % 01/05/23 1145    Glucose Fasting GTT       Glucose Tolerance Test 1  hour       Glucose Tolerance Test 3 hour       Gonorrhea (discrete)  Negative  22 1345    Chlamydia (discrete)  Negative  22 1345    RPR       VDRL       Syphilis Antibody       HBsAg  Non-Reactive  23 1145    Herpes Simplex Virus PCR       Herpes Simplex VIrus Culture       HIV  Non-Reactive  23 1145    Hep C RNA Quant PCR       Hep C Antibody  Non-Reactive  23 1145    AFP       Group B Strep       GBS Susceptibility to Clindamycin       GBS Susceptibility to Erythromycin       Fetal Fibronectin       Genetic Testing, Maternal Blood             Drug Screening     Test Value Date Time    Urine Drug Screen       Amphetamine Screen       Barbiturate Screen       Benzodiazepine Screen       Methadone Screen       Phencyclidine Screen       Opiates Screen       THC Screen       Cocaine Screen       Propoxyphene Screen       Buprenorphine Screen       Methamphetamine Screen       Oxycodone Screen       Tricyclic Antidepressants Screen             Legend    ^: Historical                         Past OB History:     OB History    Para Term  AB Living   2 0 0 0 1 0   SAB IAB Ectopic Molar Multiple Live Births   0 1 0 0 0 0      # Outcome Date GA Lbr Elian/2nd Weight Sex Delivery Anes PTL Lv   2 Current            1 IAB 2017               Past Medical History: Past Medical History:   Diagnosis Date   • Abnormal uterine bleeding    • Anxiety    • Migraine    • Ovarian cyst    • Urinary tract infection       Past Surgical History No past surgical history on file.   Family History: Family History   Problem Relation Age of Onset   • Hypertension Father    • Prostate cancer Father    • Hypertension Mother    • Diabetes Maternal Grandmother    • Ovarian cancer Neg Hx    • Uterine cancer Neg Hx    • Breast cancer Neg Hx    • Colon cancer Neg Hx    • Melanoma Neg Hx    • Clotting disorder Neg Hx       Social History:  reports that she has never smoked. She has been exposed to tobacco smoke.  She has never used smokeless tobacco.   reports that she does not currently use alcohol.   reports no history of drug use.        General ROS: Pertinent items are noted in HPI    Objective       Vital Signs Range for the last 24 hours  Temperature: Temp:  [97.7 °F (36.5 °C)-98.1 °F (36.7 °C)] 98.1 °F (36.7 °C)   Temp Source: Temp src: Oral   BP: BP: (120-124)/(58-69) 124/58   Pulse: Heart Rate:  [90-97] 93   Respirations: Resp:  [18] 18   SPO2: SpO2:  [100 %] 100 %   O2 Amount (l/min):     O2 Devices Device (Oxygen Therapy): room air   Weight: Weight:  [49.8 kg (109 lb 12.6 oz)] 49.8 kg (109 lb 12.6 oz)     Physical Examination: General appearance - alert, well appearing, and in no distress  Mental status - alert, oriented to person, place, and time  Chest - clear to auscultation, no wheezes, rales or rhonchi, symmetric air entry  Heart - normal rate, regular rhythm, normal S1, S2, no murmurs, rubs, clicks or gallops  Abdomen - soft, nontender, gravid              Extremities -trace edema  Presentation:  Unknown   Cervix: Exam by: Method: sterile exam per RN   Dilation: Cervical Dilation (cm): 0-1   Effacement:  0   Station:         Fetal Heart Rate Assessment   Method: Fetal HR Assessment Method: external   Beats/min: Fetal HR (beats/min): 130   Baseline: Fetal HR Baseline: normal range   Variability: Fetal HR Variability: moderate (amplitude range 6 to 25 bpm)   Accels: Fetal HR Accelerations: episodic, greater than/equal to 15 bpm, lasting at least 15 seconds   Decels: Fetal HR Decelerations: absent         Uterine Assessment   Method: Method: palpation, external tocotransducer   Frequency (min):     Ctx Count in 10 min:     Duration:     Intensity: Contraction Intensity: no contractions       [place] Units:       GBS is unknown      Assessment & Plan     Low back pain      Assessment:  1.  Intrauterine pregnancy at 33w5d gestation with reactive fetal status.    2.  IUP at 33 weeks and 5 days estimate  gestational age with low back pain.  There is no evidence for  labor at this time.    Plan:  Discharge home   labor Precautions  Strict fetal kick count    All Alcaraz MD  2023  10:01 EDT

## 2023-04-27 ENCOUNTER — ROUTINE PRENATAL (OUTPATIENT)
Dept: OBSTETRICS AND GYNECOLOGY | Facility: CLINIC | Age: 28
End: 2023-04-27
Payer: COMMERCIAL

## 2023-04-27 VITALS — WEIGHT: 110 LBS | BODY MASS INDEX: 20.78 KG/M2 | DIASTOLIC BLOOD PRESSURE: 74 MMHG | SYSTOLIC BLOOD PRESSURE: 112 MMHG

## 2023-04-27 DIAGNOSIS — Z34.90 ENCOUNTER FOR SUPERVISION OF NORMAL PREGNANCY, ANTEPARTUM, UNSPECIFIED GRAVIDITY: Primary | ICD-10-CM

## 2023-04-27 DIAGNOSIS — O26.849 FETAL SIZE INCONSISTENT WITH DATES: ICD-10-CM

## 2023-04-27 LAB
GLUCOSE UR STRIP-MCNC: NEGATIVE MG/DL
PROT UR STRIP-MCNC: NEGATIVE MG/DL

## 2023-04-27 PROCEDURE — 87086 URINE CULTURE/COLONY COUNT: CPT | Performed by: OBSTETRICS & GYNECOLOGY

## 2023-04-28 LAB — BACTERIA SPEC AEROBE CULT: NORMAL

## 2023-04-28 NOTE — PROGRESS NOTES
OB FOLLOW UP  CC- Here for care of pregnancy        Jami Gutierrez is a 27 y.o.  34w2d patient being seen today for her obstetrical follow up visit. Patient reports no complaints and pt feels baby very low.  Desires cervical exam.     Her prenatal care is complicated by (and status) :    Patient Active Problem List   Diagnosis   • Menometrorrhagia   • Idiopathic scoliosis and kyphoscoliosis   • Encounter for supervision of normal pregnancy, antepartum   • Iron deficiency anemia during pregnancy   • Late prenatal care affecting pregnancy, antepartum   • Fetal size inconsistent with dates   • Acute intractable headache   • Proteinuria affecting pregnancy in third trimester       Flu Status:   Covid Status:    ROS -   Patient Reports : No Problems  Patient Denies: Loss of Fluid and Vaginal Spotting  Fetal Movement : normal  All other systems reviewed and are negative.       The additional following portions of the patient's history were reviewed and updated as appropriate: allergies, current medications, past family history, past medical history, past social history, past surgical history and problem list.    I have reviewed and agree with the HPI, ROS, and historical information as entered above. Bijal Quinteros, DO    /74   Wt 49.9 kg (110 lb)   BMI 20.78 kg/m²       EXAM:     FHT: 150 BPM   Uterine Size: 29 cm  Pelvic Exam: Yes.  Presentation: cephalic. Dilation: Closed. Effacement: Long. Station: -2.    Urine glucose/protein: See prenatal flowsheet       Assessment and Plan  Diagnoses and all orders for this visit:    1. Encounter for supervision of normal pregnancy, antepartum, unspecified  (Primary)  Overview:  Initial visit:  • PNL:NML  • PAP/GC/CT/Urine culture: > 100k E Coli  • Blood type:O Pos  • Hx of HSV?: No    Genetic testing:    • Considering     Vaccinations:  • COVID:   • Influenza:     24-28 weeks:  • 1hr GCT:  • Hct/Plt:  • Tdap Rx  • Rhogam indicated:      Third  Trimester:  • GBS  • Breast pump:    Ultrasound:  • Dating:   • Anatomy: 1/26/23:  KENISHA confirmed SIUP + cardiac activity @151. All anatomy seen and appears nml , Vertex, post placenta Grade 1 male  • Follow up:     PLAN:        Orders:  -     POC Urinalysis Dipstick    2. Fetal size inconsistent with dates  Assessment & Plan:  Size less than dates f/u sono scheduled 5/4/23         1. Pregnancy at 34w2d  2. Fetal status reassuring.   3. Activity and Exercise discussed.  Return in about 2 weeks (around 5/11/2023).   Kick counts bid    Bijal Quinteros DO  04/27/2023

## 2023-05-04 ENCOUNTER — ROUTINE PRENATAL (OUTPATIENT)
Dept: OBSTETRICS AND GYNECOLOGY | Facility: CLINIC | Age: 28
End: 2023-05-04
Payer: COMMERCIAL

## 2023-05-04 VITALS — BODY MASS INDEX: 21.16 KG/M2 | SYSTOLIC BLOOD PRESSURE: 110 MMHG | DIASTOLIC BLOOD PRESSURE: 73 MMHG | WEIGHT: 112 LBS

## 2023-05-04 DIAGNOSIS — Z34.90 ENCOUNTER FOR SUPERVISION OF NORMAL PREGNANCY, ANTEPARTUM, UNSPECIFIED GRAVIDITY: ICD-10-CM

## 2023-05-04 LAB
GLUCOSE UR STRIP-MCNC: ABNORMAL MG/DL
PROT UR STRIP-MCNC: ABNORMAL MG/DL

## 2023-05-04 PROCEDURE — 87077 CULTURE AEROBIC IDENTIFY: CPT | Performed by: OBSTETRICS & GYNECOLOGY

## 2023-05-04 PROCEDURE — 87086 URINE CULTURE/COLONY COUNT: CPT | Performed by: OBSTETRICS & GYNECOLOGY

## 2023-05-04 PROCEDURE — 87186 SC STD MICRODIL/AGAR DIL: CPT | Performed by: OBSTETRICS & GYNECOLOGY

## 2023-05-04 NOTE — PROGRESS NOTES
OB FOLLOW UP  CC- Here for care of pregnancy        Jami Gutierrez is a 27 y.o.  35w1d patient being seen today for her obstetrical follow up visit. Patient reports no complaints.     Her prenatal care is complicated by (and status) :    Patient Active Problem List   Diagnosis   • Menometrorrhagia   • Idiopathic scoliosis and kyphoscoliosis   • Encounter for supervision of normal pregnancy, antepartum   • Iron deficiency anemia during pregnancy   • Late prenatal care affecting pregnancy, antepartum   • Fetal size inconsistent with dates   • Acute intractable headache   • Proteinuria affecting pregnancy in third trimester       Flu Status:   Covid Status:    ROS -   Patient Reports : No Problems  Patient Denies: Loss of Fluid and Contractions  Fetal Movement : normal  All other systems reviewed and are negative.       The additional following portions of the patient's history were reviewed and updated as appropriate: allergies, current medications, past family history, past medical history, past social history, past surgical history and problem list.    I have reviewed and agree with the HPI, ROS, and historical information as entered above. Bijal Quinteros, DO    /73   Wt 50.8 kg (112 lb)   BMI 21.16 kg/m²       EXAM:     FHT: 141 BPM   Uterine Size: size less than dates  Pelvic Exam: No    Urine glucose/protein: See prenatal flowsheet       Assessment and Plan  Diagnoses and all orders for this visit:    1. Encounter for supervision of normal pregnancy, antepartum, unspecified   Overview:  Initial visit:  • PNL:NML  • PAP/GC/CT/Urine culture: > 100k E Coli  • Blood type:O Pos  • Hx of HSV?: No    Genetic testing:    • Considering     Vaccinations:  • COVID:   • Influenza:     24-28 weeks:  • 1hr GCT:  • Hct/Plt:  • Tdap Rx  • Rhogam indicated:      Third Trimester:  • GBS  • Breast pump:    Ultrasound:  • Dating:   • Anatomy: 23:  KENISHA confirmed SIUP + cardiac activity @151. All  anatomy seen and appears nml , Vertex, post placenta Grade 1 male  • Follow up: 5/4/23 EFW: 2368 g (5lb 4oz) 23rd% CARMELO: 11cm, vertex, post placenta grade 1 Male + cardiac activity @141    PLAN:        Orders:  -     POC Urinalysis Dipstick  -     Urine Culture - Urine, Urine, Clean Catch; Future       1. Pregnancy at 35w1d  2. Fetal status reassuring.   3. Activity and Exercise discussed.  Return in about 1 week (around 5/11/2023).   Kick counts bid  Group B strep next visit    Bijal Quinteros DO  05/04/2023

## 2023-05-07 LAB — BACTERIA SPEC AEROBE CULT: ABNORMAL

## 2023-05-08 ENCOUNTER — HOSPITAL ENCOUNTER (OUTPATIENT)
Facility: HOSPITAL | Age: 28
Discharge: HOME | End: 2023-05-08
Attending: OBSTETRICS & GYNECOLOGY | Admitting: OBSTETRICS & GYNECOLOGY
Payer: COMMERCIAL

## 2023-05-08 VITALS
HEART RATE: 88 BPM | DIASTOLIC BLOOD PRESSURE: 69 MMHG | BODY MASS INDEX: 21.14 KG/M2 | WEIGHT: 112 LBS | HEIGHT: 61 IN | SYSTOLIC BLOOD PRESSURE: 110 MMHG

## 2023-05-08 LAB
BACTERIA UR QL AUTO: ABNORMAL /HPF
BACTERIA UR QL AUTO: ABNORMAL /HPF
BILIRUB UR QL STRIP: NEGATIVE
BILIRUB UR QL STRIP: NEGATIVE
CLARITY UR: ABNORMAL
CLARITY UR: CLEAR
COLOR UR: YELLOW
COLOR UR: YELLOW
GLUCOSE UR STRIP-MCNC: ABNORMAL MG/DL
GLUCOSE UR STRIP-MCNC: NEGATIVE MG/DL
HGB UR QL STRIP.AUTO: ABNORMAL
HGB UR QL STRIP.AUTO: NEGATIVE
HYALINE CASTS UR QL AUTO: ABNORMAL /LPF
HYALINE CASTS UR QL AUTO: ABNORMAL /LPF
KETONES UR QL STRIP: NEGATIVE
KETONES UR QL STRIP: NEGATIVE
LEUKOCYTE ESTERASE UR QL STRIP.AUTO: NEGATIVE
LEUKOCYTE ESTERASE UR QL STRIP.AUTO: NEGATIVE
NITRITE UR QL STRIP: NEGATIVE
NITRITE UR QL STRIP: NEGATIVE
PH UR STRIP.AUTO: >=9 [PH] (ref 5–8)
PH UR STRIP.AUTO: >=9 [PH] (ref 5–8)
PROT UR QL STRIP: ABNORMAL
PROT UR QL STRIP: ABNORMAL
RBC # UR STRIP: ABNORMAL /HPF
RBC # UR STRIP: ABNORMAL /HPF
REF LAB TEST METHOD: ABNORMAL
REF LAB TEST METHOD: ABNORMAL
SP GR UR STRIP: 1.02 (ref 1–1.03)
SP GR UR STRIP: 1.02 (ref 1–1.03)
SQUAMOUS #/AREA URNS HPF: ABNORMAL /HPF
SQUAMOUS #/AREA URNS HPF: ABNORMAL /HPF
TRANS CELLS #/AREA URNS HPF: ABNORMAL /HPF
UROBILINOGEN UR QL STRIP: ABNORMAL
UROBILINOGEN UR QL STRIP: ABNORMAL
WBC # UR STRIP: ABNORMAL /HPF
WBC # UR STRIP: ABNORMAL /HPF

## 2023-05-08 PROCEDURE — G0463 HOSPITAL OUTPT CLINIC VISIT: HCPCS

## 2023-05-08 PROCEDURE — 59025 FETAL NON-STRESS TEST: CPT

## 2023-05-08 PROCEDURE — 81001 URINALYSIS AUTO W/SCOPE: CPT | Performed by: OBSTETRICS & GYNECOLOGY

## 2023-05-08 PROCEDURE — 87086 URINE CULTURE/COLONY COUNT: CPT | Performed by: OBSTETRICS & GYNECOLOGY

## 2023-05-08 RX ORDER — PRENATAL VIT/IRON FUM/FOLIC AC 27MG-0.8MG
TABLET ORAL DAILY
Status: ON HOLD | COMMUNITY
End: 2023-05-11

## 2023-05-08 NOTE — NON STRESS TEST
"Obstetrical Non-stress Test Interpretation     Name:  Jami Gutierrez  MRN: [id number]    27 y.o. female  at 35w5d    Indication:contractions      Fetal Assessment  Fetal Movement: active  Fetal HR Assessment Method: external  Fetal HR (beats/min): 140  Fetal HR Baseline: normal range  Fetal HR Variability: moderate (amplitude range 6 to 25 bpm)  Fetal HR Accelerations: greater than/equal to 15 bpm, lasting at least 15 seconds  Fetal HR Decelerations: absent    /69   Pulse 88   Ht 154.9 cm (61\")   Wt 50.8 kg (112 lb)   BMI 21.16 kg/m²     Reason for test: OB Triage  Date of Test: 2023  Time frame of test: 1096-4929  RN NST Interpretation: Minerva Villalta  2023  18:34 EDT  "

## 2023-05-09 ENCOUNTER — TELEPHONE (OUTPATIENT)
Dept: OBSTETRICS AND GYNECOLOGY | Facility: CLINIC | Age: 28
End: 2023-05-09
Payer: COMMERCIAL

## 2023-05-09 LAB — BACTERIA SPEC AEROBE CULT: NORMAL

## 2023-05-09 NOTE — TELEPHONE ENCOUNTER
----- Message from Bijal Quinteros, DO sent at 5/7/2023  6:29 PM EDT -----  Please confirm what her allergy to pcn is and let me know.  She has another uti. I want to send in keflex. She will likely need suppression with macrobid after she completes next course of antibiotics

## 2023-05-09 NOTE — OBED NOTES
TRACY Henderson  Obstetric History and Physical    Chief Complaint   Patient presents with   • Abdominal Pain   • Back Pain       Subjective     Patient is a 27 y.o. female  currently at 35w5d, who presents with complaint of contractions.  She denies any vaginal bleeding.  Positive fetal movements.  No fever or chills.  No headache visual disturbance or right upper quadrant pain.    Her prenatal care is benign.  Her previous obstetric/gynecological history is noted for is non-contributory.    The following portions of the patients history were reviewed and updated as appropriate: current medications, allergies, past medical history, past surgical history, past family history, past social history and problem list .       Prenatal Information:  Prenatal Results     POC Urine Glucose/Protein     Test Value Reference Range Date Time    Urine Glucose  3+ mg/dL Negative 23 1436    Urine Protein  Trace mg/dL Negative 23 1436          Initial Prenatal Labs     Test Value Reference Range Date Time    Hemoglobin  11.2 g/dL 12.0 - 15.9 23 1145    Hematocrit  33.2 % 34.0 - 46.6 23 1145    Platelets  386 10*3/mm3 140 - 450 23 1145    Rubella IgG  6.80 index Immune >0.99 23 1145    Hepatitis B SAg  Non-Reactive  Non-Reactive 23 1145    Hepatitis C Ab  Non-Reactive  Non-Reactive 23 1145    RPR        T. Pallidum Ab   Non-Reactive  Non-Reactive 23 1145    ABO  O   23 1145    Rh  Positive   23 1145    Antibody Screen  Negative   23 1145    HIV  Non-Reactive  Non-Reactive 23 1145    Urine Culture  >100,000 CFU/mL Escherichia coli   23 1641       25,000 CFU/mL Mixed Kell Isolated   23 1700       >100,000 CFU/mL Escherichia coli   23 1605       >100,000 CFU/mL Escherichia coli   23 1516       >100,000 CFU/mL Escherichia coli   23 1621       >100,000 CFU/mL Escherichia coli   22 1345    Gonorrhea  Negative  Negative  12/22/22 1345    Chlamydia  Negative  Negative 12/22/22 1345    TSH  1.650 uIU/mL 0.270 - 4.200 01/11/22 0911    HgB A1c               2nd and 3rd Trimester     Test Value Reference Range Date Time    Hemoglobin (repeated)  11.0 g/dL 12.0 - 15.9 04/23/23 2146       10.8 g/dL 12.0 - 15.9 04/19/23 1354       10.8 g/dL 12.0 - 15.9 02/21/23 1607    Hematocrit (repeated)  33.7 % 34.0 - 46.6 04/23/23 2146       32.1 % 34.0 - 46.6 04/19/23 1354       33.3 % 34.0 - 46.6 02/21/23 1607    Platelets   382 10*3/mm3 140 - 450 04/23/23 2146       385 10*3/mm3 140 - 450 04/19/23 1354       396 10*3/mm3 140 - 450 02/21/23 1607       386 10*3/mm3 140 - 450 01/05/23 1145    GCT  108 mg/dL 65 - 139 02/21/23 1607    Antibody Screen (repeated)  Negative   01/05/23 1145    GTT Fasting        GTT 1 Hr        GTT 2 Hr        GTT 3 Hr        Group B Strep              Drug Screening     Test Value Reference Range Date Time    Amphetamine Screen        Barbiturate Screen        Benzodiazepine Screen        Methadone Screen        Phencyclidine Screen        Opiates Screen        THC Screen        Cocaine Screen        Propoxyphene Screen        Buprenorphine Screen        Methamphetamine Screen        Oxycodone Screen        Tricyclic Antidepressants Screen              Other (Risk screening)     Test Value Reference Range Date Time    Varicella IgG        Parvovirus IgG        CMV IgG        Cystic Fibrosis        Hemoglobin electrophoresis        NIPT        MSAFP-4        AFP (for NTD only)              Legend    ^: Historical                      External Prenatal Results     Pregnancy Outside Results - Transcribed From Office Records - See Scanned Records For Details     Test Value Date Time    ABO  O  01/05/23 1145    Rh  Positive  01/05/23 1145    Antibody Screen  Negative  01/05/23 1145    Varicella IgG       Rubella  6.80 index 01/05/23 1145    Hgb  11.0 g/dL 04/23/23 2146       10.8 g/dL 04/19/23 1354       10.8 g/dL 02/21/23 1607        11.2 g/dL 23 1145    Hct  33.7 % 23 2146       32.1 % 23 1354       33.3 % 23 1607       33.2 % 23 1145    Glucose Fasting GTT       Glucose Tolerance Test 1 hour       Glucose Tolerance Test 3 hour       Gonorrhea (discrete)  Negative  22 1345    Chlamydia (discrete)  Negative  22 1345    RPR       VDRL       Syphilis Antibody       HBsAg  Non-Reactive  23 1145    Herpes Simplex Virus PCR       Herpes Simplex VIrus Culture       HIV  Non-Reactive  23 1145    Hep C RNA Quant PCR       Hep C Antibody  Non-Reactive  23 1145    AFP       Group B Strep       GBS Susceptibility to Clindamycin       GBS Susceptibility to Erythromycin       Fetal Fibronectin       Genetic Testing, Maternal Blood             Drug Screening     Test Value Date Time    Urine Drug Screen       Amphetamine Screen       Barbiturate Screen       Benzodiazepine Screen       Methadone Screen       Phencyclidine Screen       Opiates Screen       THC Screen       Cocaine Screen       Propoxyphene Screen       Buprenorphine Screen       Methamphetamine Screen       Oxycodone Screen       Tricyclic Antidepressants Screen             Legend    ^: Historical                         Past OB History:     OB History    Para Term  AB Living   2 0 0 0 1 0   SAB IAB Ectopic Molar Multiple Live Births   0 1 0 0 0 0      # Outcome Date GA Lbr Elian/2nd Weight Sex Delivery Anes PTL Lv   2 Current            1 IAB 2017               Past Medical History: Past Medical History:   Diagnosis Date   • Abnormal uterine bleeding    • Anxiety    • Migraine    • Ovarian cyst    • Urinary tract infection       Past Surgical History No past surgical history on file.   Family History: Family History   Problem Relation Age of Onset   • Hypertension Father    • Prostate cancer Father    • Hypertension Mother    • Diabetes Maternal Grandmother    • Ovarian cancer Neg Hx    • Uterine cancer Neg Hx    •  Breast cancer Neg Hx    • Colon cancer Neg Hx    • Melanoma Neg Hx    • Clotting disorder Neg Hx       Social History:  reports that she has never smoked. She has been exposed to tobacco smoke. She has never used smokeless tobacco.   reports that she does not currently use alcohol.   reports no history of drug use.        General ROS: Pertinent items are noted in HPI    Objective       Vital Signs Range for the last 24 hours  Temperature:     Temp Source:     BP: BP: (110-113)/(67-69) 110/69   Pulse: Heart Rate:  [85-88] 88   Respirations:     SPO2:     O2 Amount (l/min):     O2 Devices     Weight: Weight:  [50.8 kg (112 lb)] 50.8 kg (112 lb)     Physical Examination: General appearance - alert, well appearing, and in no distress  Mental status - alert, oriented to person, place, and time  Chest - clear to auscultation, no wheezes, rales or rhonchi, symmetric air entry  Heart - normal rate, regular rhythm, normal S1, S2, no murmurs, rubs, clicks or gallops  Abdomen - soft, nontender, gravid  Extremities -no edema    Presentation:  Unknown   Cervix: Exam by: Method: sterile exam per RN   Dilation: Cervical Dilation (cm): 1   Effacement:     Station:         Fetal Heart Rate Assessment   Method: Fetal HR Assessment Method: external   Beats/min: Fetal HR (beats/min): 140   Baseline: Fetal HR Baseline: normal range   Variability: Fetal HR Variability: moderate (amplitude range 6 to 25 bpm)   Accels: Fetal HR Accelerations: greater than/equal to 15 bpm, lasting at least 15 seconds   Decels: Fetal HR Decelerations: absent         Uterine Assessment   Method: Method: external tocotransducer   Frequency (min):     Ctx Count in 10 min:     Duration:     Intensity: Contraction Intensity: no contractions       [place] Units:       GBS is unknown      Assessment & Plan     Contractions during pregnancy      Assessment:  1.  Intrauterine pregnancy at 35w5d gestation with reactive fetal status.    2.  IUP at 35 weeks and 5  days estimate gestational age with  contractions.  Cervix was unchanged compared to prior exams.    Plan:  Discharge home   labor precautions  Strict fetal kick count      All Alcarza MD  2023  22:03 EDT

## 2023-05-10 ENCOUNTER — TELEPHONE (OUTPATIENT)
Dept: OBSTETRICS AND GYNECOLOGY | Facility: CLINIC | Age: 28
End: 2023-05-10

## 2023-05-10 NOTE — TELEPHONE ENCOUNTER
Caller: Jami Gutierrez    Relationship: Self    Best call back number: [phone]    What is the best time to reach you: ANY    Who are you requesting to speak with (clinical staff, provider,  specific staff member): ?? ORIN    Do you know the name of the person who called: ORIN    What was the call regarding: TEST RESULTS?    Do you require a callback: YES

## 2023-05-10 NOTE — TELEPHONE ENCOUNTER
Pt called stating she was unsure what her allergy to pcn was as she was a young child. I let her know that she does have a UTI and medications will be sent to her pharmacy.

## 2023-05-11 ENCOUNTER — ROUTINE PRENATAL (OUTPATIENT)
Dept: OBSTETRICS AND GYNECOLOGY | Facility: CLINIC | Age: 28
End: 2023-05-11
Payer: COMMERCIAL

## 2023-05-11 ENCOUNTER — HOSPITAL ENCOUNTER (OUTPATIENT)
Facility: HOSPITAL | Age: 28
Discharge: HOME | End: 2023-05-11
Attending: OBSTETRICS & GYNECOLOGY | Admitting: OBSTETRICS & GYNECOLOGY
Payer: COMMERCIAL

## 2023-05-11 VITALS
RESPIRATION RATE: 18 BRPM | DIASTOLIC BLOOD PRESSURE: 66 MMHG | HEART RATE: 81 BPM | OXYGEN SATURATION: 100 % | SYSTOLIC BLOOD PRESSURE: 116 MMHG

## 2023-05-11 VITALS — DIASTOLIC BLOOD PRESSURE: 67 MMHG | WEIGHT: 110.8 LBS | SYSTOLIC BLOOD PRESSURE: 98 MMHG | BODY MASS INDEX: 20.94 KG/M2

## 2023-05-11 DIAGNOSIS — Z34.90 ENCOUNTER FOR SUPERVISION OF NORMAL PREGNANCY, ANTEPARTUM, UNSPECIFIED GRAVIDITY: ICD-10-CM

## 2023-05-11 PROBLEM — R10.2 PELVIC PRESSURE IN PREGNANCY, THIRD TRIMESTER: Status: ACTIVE | Noted: 2023-05-11

## 2023-05-11 PROBLEM — O26.893 PELVIC PRESSURE IN PREGNANCY, THIRD TRIMESTER: Status: ACTIVE | Noted: 2023-05-11

## 2023-05-11 PROBLEM — R00.2 PALPITATIONS: Status: ACTIVE | Noted: 2023-05-11

## 2023-05-11 LAB
ALBUMIN SERPL-MCNC: 4.1 G/DL (ref 3.5–5.2)
ALBUMIN/GLOB SERPL: 1.1 G/DL
ALP SERPL-CCNC: 118 U/L (ref 39–117)
ALT SERPL W P-5'-P-CCNC: 13 U/L (ref 1–33)
ANION GAP SERPL CALCULATED.3IONS-SCNC: 14.6 MMOL/L (ref 5–15)
AST SERPL-CCNC: 20 U/L (ref 1–32)
BILIRUB SERPL-MCNC: 0.6 MG/DL (ref 0–1.2)
BUN SERPL-MCNC: 3 MG/DL (ref 6–20)
BUN/CREAT SERPL: 4.5 (ref 7–25)
CALCIUM SPEC-SCNC: 9.8 MG/DL (ref 8.6–10.5)
CHLORIDE SERPL-SCNC: 98 MMOL/L (ref 98–107)
CO2 SERPL-SCNC: 20.4 MMOL/L (ref 22–29)
CREAT SERPL-MCNC: 0.67 MG/DL (ref 0.57–1)
DEPRECATED RDW RBC AUTO: 39.6 FL (ref 37–54)
EGFRCR SERPLBLD CKD-EPI 2021: 123.8 ML/MIN/1.73
ERYTHROCYTE [DISTWIDTH] IN BLOOD BY AUTOMATED COUNT: 12.5 % (ref 12.3–15.4)
GLOBULIN UR ELPH-MCNC: 3.8 GM/DL
GLUCOSE SERPL-MCNC: 100 MG/DL (ref 65–99)
GLUCOSE UR STRIP-MCNC: NEGATIVE MG/DL
HCT VFR BLD AUTO: 36.2 % (ref 34–46.6)
HGB BLD-MCNC: 11.7 G/DL (ref 12–15.9)
MAGNESIUM SERPL-MCNC: 1.7 MG/DL (ref 1.6–2.6)
MCH RBC QN AUTO: 28.1 PG (ref 26.6–33)
MCHC RBC AUTO-ENTMCNC: 32.3 G/DL (ref 31.5–35.7)
MCV RBC AUTO: 86.8 FL (ref 79–97)
PLATELET # BLD AUTO: 384 10*3/MM3 (ref 140–450)
PMV BLD AUTO: 9.5 FL (ref 6–12)
POTASSIUM SERPL-SCNC: 3.7 MMOL/L (ref 3.5–5.2)
PROT SERPL-MCNC: 7.9 G/DL (ref 6–8.5)
PROT UR STRIP-MCNC: NEGATIVE MG/DL
QT INTERVAL: 310 MS
RBC # BLD AUTO: 4.17 10*6/MM3 (ref 3.77–5.28)
SODIUM SERPL-SCNC: 133 MMOL/L (ref 136–145)
WBC NRBC COR # BLD: 5.63 10*3/MM3 (ref 3.4–10.8)

## 2023-05-11 PROCEDURE — 93005 ELECTROCARDIOGRAM TRACING: CPT | Performed by: OBSTETRICS & GYNECOLOGY

## 2023-05-11 PROCEDURE — 59025 FETAL NON-STRESS TEST: CPT

## 2023-05-11 PROCEDURE — 87653 STREP B DNA AMP PROBE: CPT | Performed by: OBSTETRICS & GYNECOLOGY

## 2023-05-11 PROCEDURE — 83735 ASSAY OF MAGNESIUM: CPT | Performed by: OBSTETRICS & GYNECOLOGY

## 2023-05-11 PROCEDURE — G0463 HOSPITAL OUTPT CLINIC VISIT: HCPCS

## 2023-05-11 PROCEDURE — 85027 COMPLETE CBC AUTOMATED: CPT | Performed by: OBSTETRICS & GYNECOLOGY

## 2023-05-11 PROCEDURE — 80053 COMPREHEN METABOLIC PANEL: CPT | Performed by: OBSTETRICS & GYNECOLOGY

## 2023-05-11 RX ORDER — CEPHALEXIN 500 MG/1
500 CAPSULE ORAL 3 TIMES DAILY
Qty: 21 CAPSULE | Refills: 0 | Status: SHIPPED | OUTPATIENT
Start: 2023-05-11 | End: 2023-05-21

## 2023-05-11 RX ORDER — FERROUS SULFATE 325(65) MG
325 TABLET ORAL
Qty: 30 TABLET | Refills: 6 | Status: SHIPPED | OUTPATIENT
Start: 2023-05-11

## 2023-05-11 NOTE — NON STRESS TEST
Obstetrical Non-stress Test Interpretation     Name:  Jami Gutierrez  MRN: [id number]    27 y.o. female  at 36w1d    Indication: NST/LOWER BACK PAIN AND PRESSURE SINCE MONDAY      Fetal Assessment  Fetal Movement: active  Fetal HR (beats/min): 130  Fetal HR Baseline: normal range  Fetal HR Variability: moderate (amplitude range 6 to 25 bpm)  Fetal HR Accelerations: lasting at least 15 seconds  Fetal HR Decelerations: absent    /66 (BP Location: Right arm, Patient Position: Sitting)   Pulse 81   Resp 18   SpO2 100%     Reason for test: OB Triage (NST/LOWER BACK PAIN AND PRESSURE SINCE MONDAY)  Date of Test: 2023  Time frame of test:   RN NST Interpretation: Reactive      Juana Tamayo RN  2023  11:17 EDT         I have reviewed NST and agree it is reactive. TNEELY

## 2023-05-11 NOTE — PROGRESS NOTES
OB FOLLOW UP  CC- Here for care of pregnancy        Jami Gutierrez is a 27 y.o.  36w1d patient being seen today for her obstetrical follow up visit. Patient reports no contractions and chest pain has been to labor and delivery.     Her prenatal care is complicated by (and status) :    Patient Active Problem List   Diagnosis   • Menometrorrhagia   • Idiopathic scoliosis and kyphoscoliosis   • Encounter for supervision of normal pregnancy, antepartum   • Iron deficiency anemia during pregnancy   • Late prenatal care affecting pregnancy, antepartum   • Fetal size inconsistent with dates   • Acute intractable headache   • Proteinuria affecting pregnancy in third trimester       Flu Status:   Covid Status:    ROS -   Patient Reports : Contractions  Patient Denies: Loss of Fluid and Vaginal Spotting  Fetal Movement : normal  All other systems reviewed and are negative.       The additional following portions of the patient's history were reviewed and updated as appropriate: allergies, current medications, past family history, past medical history, past social history, past surgical history and problem list.    I have reviewed and agree with the HPI, ROS, and historical information as entered above. Bijal Quinteros, DO    BP 98/67   Wt 50.3 kg (110 lb 12.8 oz)   BMI 20.94 kg/m²       EXAM:     FHT: 140 BPM   Uterine Size: 31 cm  Pelvic Exam: Yes.  Presentation: cephalic. Dilation: 2cm. Effacement: 50%. Station: -2.    Urine glucose/protein: See prenatal flowsheet       Assessment and Plan  Diagnoses and all orders for this visit:    1. Encounter for supervision of normal pregnancy, antepartum, unspecified   Overview:  Initial visit:  • PNL:NML  • PAP/GC/CT/Urine culture: > 100k E Coli  • Blood type:O Pos  • Hx of HSV?: No    Genetic testing:    • Considering     Vaccinations:  • COVID:   • Influenza:     24-28 weeks:  • 1hr GCT:  • Hct/Plt:  • Tdap Rx  • Rhogam indicated:      Third  Trimester:  • GBS  • Breast pump:    Ultrasound:  • Dating:   • Anatomy: 1/26/23:  KENISHA confirmed SIUP + cardiac activity @151. All anatomy seen and appears nml , Vertex, post placenta Grade 1 male  • Follow up: 5/4/23 EFW: 2368 g (5lb 4oz) 23rd% CARMELO: 11cm, vertex, post placenta grade 1 Male + cardiac activity @141    PLAN:        Orders:  -     POC Urinalysis Dipstick  -     Group B Strep (Molecular) - Swab, Vaginal/Rectum; Future  -     Group B Strep (Molecular) - Swab, Vaginal/Rectum         1. Pregnancy at 36w1d  2. Fetal status reassuring.   3. Activity and Exercise discussed.  4. Return in about 1 week (around 5/18/2023).   5. Labor precautions  6. Kick counts maureen Quinteros DO  05/11/2023

## 2023-05-12 LAB — GROUP B STREP, DNA: NEGATIVE

## 2023-05-12 NOTE — TELEPHONE ENCOUNTER
Left message on patients voicemail and sent a my chart message letting her know keflex has been sent to her pharmacy.

## 2023-05-12 NOTE — PROGRESS NOTES
[facility]  Obstetric History and Physical    Chief complaint palpitations low back pain and pressure      Subjective:   HPI:    Patient is a 27 y.o. female  currently at 36w1d, who presents with complaints of palpitation with a sense of her heart beating.  This is been more prominent the last couple of weeks heart is felt to be bleeding somewhat fast.  Recent diagnosis of UTI with E. coli.  Denies fever chills or sweats.    Also having some pressure denies associated rupture membranes or vaginal bleeding.    Her prenatal care is [facility].  Past OB history is noted below.      The following portions of the patients history were reviewed and updated as appropriate:   current medications, allergies, past medical history, past surgical history, past family history, past social history and current problem list.     Prenatal Information:  Prenatal Results     POC Urine Glucose/Protein     Test Value Reference Range Date Time    Urine Glucose  Negative mg/dL Negative 23 1400    Urine Protein  Negative mg/dL Negative 23 1400          Initial Prenatal Labs     Test Value Reference Range Date Time    Hemoglobin  11.2 g/dL 12.0 - 15.9 23 1145    Hematocrit  33.2 % 34.0 - 46.6 23 1145    Platelets  386 10*3/mm3 140 - 450 23 1145    Rubella IgG  6.80 index Immune >0.99 23 1145    Hepatitis B SAg  Non-Reactive  Non-Reactive 23 1145    Hepatitis C Ab  Non-Reactive  Non-Reactive 23 1145    RPR        T. Pallidum Ab   Non-Reactive  Non-Reactive 23 1145    ABO  O   23 1145    Rh  Positive   23 1145    Antibody Screen  Negative   23 1145    HIV  Non-Reactive  Non-Reactive 23 1145    Urine Culture  50,000 CFU/mL Mixed Kell Isolated   23 1648       >100,000 CFU/mL Escherichia coli   23 1641       25,000 CFU/mL Mixed Kell Isolated   23 1700       >100,000 CFU/mL Escherichia coli   23 1605       >100,000 CFU/mL  Escherichia coli   03/23/23 1516       >100,000 CFU/mL Escherichia coli   02/21/23 1621       >100,000 CFU/mL Escherichia coli   12/22/22 1345    Gonorrhea  Negative  Negative 12/22/22 1345    Chlamydia  Negative  Negative 12/22/22 1345    TSH  1.650 uIU/mL 0.270 - 4.200 01/11/22 0911    HgB A1c               2nd and 3rd Trimester     Test Value Reference Range Date Time    Hemoglobin (repeated)  11.7 g/dL 12.0 - 15.9 05/11/23 0908       11.0 g/dL 12.0 - 15.9 04/23/23 2146       10.8 g/dL 12.0 - 15.9 04/19/23 1354       10.8 g/dL 12.0 - 15.9 02/21/23 1607    Hematocrit (repeated)  36.2 % 34.0 - 46.6 05/11/23 0908       33.7 % 34.0 - 46.6 04/23/23 2146       32.1 % 34.0 - 46.6 04/19/23 1354       33.3 % 34.0 - 46.6 02/21/23 1607    Platelets   384 10*3/mm3 140 - 450 05/11/23 0908       382 10*3/mm3 140 - 450 04/23/23 2146       385 10*3/mm3 140 - 450 04/19/23 1354       396 10*3/mm3 140 - 450 02/21/23 1607       386 10*3/mm3 140 - 450 01/05/23 1145    GCT  108 mg/dL 65 - 139 02/21/23 1607    Antibody Screen (repeated)  Negative   01/05/23 1145    GTT Fasting        GTT 1 Hr        GTT 2 Hr        GTT 3 Hr        Group B Strep              Drug Screening     Test Value Reference Range Date Time    Amphetamine Screen        Barbiturate Screen        Benzodiazepine Screen        Methadone Screen        Phencyclidine Screen        Opiates Screen        THC Screen        Cocaine Screen        Propoxyphene Screen        Buprenorphine Screen        Methamphetamine Screen        Oxycodone Screen        Tricyclic Antidepressants Screen              Other (Risk screening)     Test Value Reference Range Date Time    Varicella IgG        Parvovirus IgG        CMV IgG        Cystic Fibrosis        Hemoglobin electrophoresis        NIPT        MSAFP-4        AFP (for NTD only)              Legend    ^: Historical                      External Prenatal Results     Pregnancy Outside Results - Transcribed From Office Records - See  Scanned Records For Details     Test Value Date Time    ABO  O  23 1145    Rh  Positive  23 1145    Antibody Screen  Negative  23 1145    Varicella IgG       Rubella  6.80 index 23 1145    Hgb  11.7 g/dL 23 0908       11.0 g/dL 23 2146       10.8 g/dL 23 1354       10.8 g/dL 23 1607       11.2 g/dL 23 1145    Hct  36.2 % 23 0908       33.7 % 23 2146       32.1 % 23 1354       33.3 % 23 1607       33.2 % 23 1145    Glucose Fasting GTT       Glucose Tolerance Test 1 hour       Glucose Tolerance Test 3 hour       Gonorrhea (discrete)  Negative  22 1345    Chlamydia (discrete)  Negative  22 1345    RPR       VDRL       Syphilis Antibody       HBsAg  Non-Reactive  23 1145    Herpes Simplex Virus PCR       Herpes Simplex VIrus Culture       HIV  Non-Reactive  23 1145    Hep C RNA Quant PCR       Hep C Antibody  Non-Reactive  23 1145    AFP       Group B Strep       GBS Susceptibility to Clindamycin       GBS Susceptibility to Erythromycin       Fetal Fibronectin       Genetic Testing, Maternal Blood             Drug Screening     Test Value Date Time    Urine Drug Screen       Amphetamine Screen       Barbiturate Screen       Benzodiazepine Screen       Methadone Screen       Phencyclidine Screen       Opiates Screen       THC Screen       Cocaine Screen       Propoxyphene Screen       Buprenorphine Screen       Methamphetamine Screen       Oxycodone Screen       Tricyclic Antidepressants Screen             Legend    ^: Historical                         Past OB History:     OB History    Para Term  AB Living   2 0 0 0 1 0   SAB IAB Ectopic Molar Multiple Live Births   0 1 0 0 0 0      # Outcome Date GA Lbr Elian/2nd Weight Sex Delivery Anes PTL Lv   2 Current            1 IAB 2017               Past Medical History: Past Medical History:   Diagnosis Date   • Abnormal uterine bleeding    • Anxiety     • Migraine    • Ovarian cyst    • Urinary tract infection       Past Surgical History No past surgical history on file.   Family History: Family History   Problem Relation Age of Onset   • Hypertension Father    • Prostate cancer Father    • Hypertension Mother    • Diabetes Maternal Grandmother    • Ovarian cancer Neg Hx    • Uterine cancer Neg Hx    • Breast cancer Neg Hx    • Colon cancer Neg Hx    • Melanoma Neg Hx    • Clotting disorder Neg Hx       Social History:  reports that she has never smoked. She has been exposed to tobacco smoke. She has never used smokeless tobacco.   reports that she does not currently use alcohol.   reports no history of drug use.        General ROS: Pertinent items are noted in HPI    Home Medications:  butalbital-acetaminophen-caffeine, cephalexin, cetirizine, and ferrous sulfate    Allergies:  Allergies   Allergen Reactions   • Latex Hives   • Penicillins Hives       Objective       Vital Signs Range for the last 24 hours  Temperature:     Temp Source:     BP: BP: ()/(66-67) 98/67   Pulse: Heart Rate:  [81] 81   Respirations: Resp:  [18] 18   SPO2: SpO2:  [100 %] 100 %     Physical Examination:   General appearance - alert, well appearing, and in no distress  Mental status - alert, oriented to person, place, and time  Chest - clear to auscultation, no wheezes, rales or rhonchi, symmetric air entry  Heart - normal rate, regular rhythm, normal S1, S2, no murmurs, rubs, clicks or gallops  Abdomen - soft, nontender, nondistended, no masses or organomegaly  Pelvic - normal external genitalia, vulva, vagina, cervix, uterus and adnexa  Back exam - full range of motion, no tenderness, palpable spasm or pain on motion  Neurological - alert, oriented, normal speech, no focal findings or movement disorder noted  Extremities - peripheral pulses normal, no pedal edema, no clubbing or cyanosis  Skin - normal coloration and turgor, no rashes, no suspicious skin lesions noted          Cervix:     Dilation:     Effacement:     Station:         Fetal Heart Rate Assessment   Method:     Beats/min:     Baseline:     Variability:     Accels:     Decels:     Tracing Category:       Uterine Assessment   Method:     Frequency (min):     Ctx Count in 10 min:     Duration:     Intensity:     [place] Units:           WBC   Date Value Ref Range Status   05/11/2023 5.63 3.40 - 10.80 10*3/mm3 Final     RBC   Date Value Ref Range Status   05/11/2023 4.17 3.77 - 5.28 10*6/mm3 Final     Hemoglobin   Date Value Ref Range Status   05/11/2023 11.7 (L) 12.0 - 15.9 g/dL Final     Hematocrit   Date Value Ref Range Status   05/11/2023 36.2 34.0 - 46.6 % Final     MCV   Date Value Ref Range Status   05/11/2023 86.8 79.0 - 97.0 fL Final     MCH   Date Value Ref Range Status   05/11/2023 28.1 26.6 - 33.0 pg Final     MCHC   Date Value Ref Range Status   05/11/2023 32.3 31.5 - 35.7 g/dL Final     RDW   Date Value Ref Range Status   05/11/2023 12.5 12.3 - 15.4 % Final     RDW-SD   Date Value Ref Range Status   05/11/2023 39.6 37.0 - 54.0 fl Final     MPV   Date Value Ref Range Status   05/11/2023 9.5 6.0 - 12.0 fL Final     Platelets   Date Value Ref Range Status   05/11/2023 384 140 - 450 10*3/mm3 Final     Lab Results   Component Value Date    GLUCOSE 100 (H) 05/11/2023    BUN 3 (L) 05/11/2023    CREATININE 0.67 05/11/2023    EGFR 123.8 05/11/2023    BCR 4.5 (L) 05/11/2023    K 3.7 05/11/2023    CO2 20.4 (L) 05/11/2023    CALCIUM 9.8 05/11/2023    ALBUMIN 4.1 05/11/2023    BILITOT 0.6 05/11/2023    AST 20 05/11/2023    ALT 13 05/11/2023         Assessment & Plan       ASSESSMENT with PLAN:   Active Hospital Problems    Diagnosis  POA   • Palpitations [R00.2]  Unknown     5/11/2023 patient seen in labor and delivery for palpitations EKG was normal there was very mild anemia she had been treated for this before but stopped taking her iron another prescription for iron was sent in     • Pelvic pressure in pregnancy,  third trimester [O26.893, R10.2]  Unknown     Patient complains of pressure worse with standing.  No real pattern of contraction and no cervical change not felt to be in labor     • Proteinuria affecting pregnancy in third trimester [O12.13]  Yes   • Acute intractable headache [R51.9]  Yes   • Fetal size inconsistent with dates [O26.849]  Yes   • Late prenatal care affecting pregnancy, antepartum [O09.30]  Not Applicable   • Iron deficiency anemia during pregnancy [O99.019, D50.9]  Yes     5/11/2023 hemoglobin is not bad for pregnancy but is having some palpitations sent in iron which she had stopped taking spontaneously     • Encounter for supervision of normal pregnancy, antepartum [Z34.90]  Not Applicable     Initial visit:  • PNL:NML  • PAP/GC/CT/Urine culture: > 100k E Coli  • Blood type:O Pos  • Hx of HSV?: No    Genetic testing:    • Considering     Vaccinations:  • COVID:   • Influenza:     24-28 weeks:  • 1hr GCT:  • Hct/Plt:  • Tdap Rx  • Rhogam indicated:      Third Trimester:  • GBS  • Breast pump:    Ultrasound:  • Dating:   • Anatomy: 1/26/23:  KENISHA confirmed SIUP + cardiac activity @151. All anatomy seen and appears nml , Vertex, post placenta Grade 1 male  • Follow up: 5/4/23 EFW: 2368 g (5lb 4oz) 23rd% CARMELO: 11cm, vertex, post placenta grade 1 Male + cardiac activity @141    PLAN:         • Idiopathic scoliosis and kyphoscoliosis [M41.20]  Yes            Plan of care has been reviewed with patient and patient agrees.   Risks, benefits of treatment plan have been discussed.  All questions have been answered.        Electronically signed by Victorino Carlton MD, 05/11/23, 9:29 PM EDT.

## 2023-05-16 LAB — QT INTERVAL: 346 MS

## 2023-05-17 ENCOUNTER — HOSPITAL ENCOUNTER (OUTPATIENT)
Facility: HOSPITAL | Age: 28
Discharge: HOME | End: 2023-05-17
Attending: OBSTETRICS & GYNECOLOGY | Admitting: OBSTETRICS & GYNECOLOGY
Payer: COMMERCIAL

## 2023-05-17 VITALS — DIASTOLIC BLOOD PRESSURE: 64 MMHG | OXYGEN SATURATION: 99 % | HEART RATE: 82 BPM | SYSTOLIC BLOOD PRESSURE: 126 MMHG

## 2023-05-17 PROBLEM — R10.2 PELVIC PRESSURE IN PREGNANCY, THIRD TRIMESTER: Status: RESOLVED | Noted: 2023-05-11 | Resolved: 2023-05-17

## 2023-05-17 PROBLEM — O26.893 PELVIC PRESSURE IN PREGNANCY, THIRD TRIMESTER: Status: RESOLVED | Noted: 2023-05-11 | Resolved: 2023-05-17

## 2023-05-17 PROBLEM — R00.2 PALPITATIONS: Status: RESOLVED | Noted: 2023-05-11 | Resolved: 2023-05-17

## 2023-05-17 PROBLEM — O47.9 IRREGULAR CONTRACTIONS: Status: ACTIVE | Noted: 2023-05-17

## 2023-05-17 PROBLEM — N92.1 MENOMETRORRHAGIA: Status: RESOLVED | Noted: 2022-01-05 | Resolved: 2023-05-17

## 2023-05-17 PROCEDURE — 59025 FETAL NON-STRESS TEST: CPT

## 2023-05-17 PROCEDURE — G0463 HOSPITAL OUTPT CLINIC VISIT: HCPCS

## 2023-05-17 NOTE — H&P
[facility]  Obstetric History and Physical    Chief Complaint   Patient presents with   • Contractions       Subjective     HPI:    Patient is a 27 y.o. female  currently at 37w0d, who presents with irregular sharp pain and pressure in her low abdomen. The discomfort is bilaterally in the groin region. The pressure is in that location and also in the vagina. She has had no complications with the pregnancy.     Her prenatal care is benign.  This is her first pregnancy.   The following portions of the patients history were reviewed and updated as appropriate:   current medications, allergies, past medical history, past surgical history, past family history, past social history and current problem list.     Prenatal Information:  Prenatal Results     POC urine glucose/protein      Test Value Reference Range Date Time    Urine glucose  Negative mg/dL Negative 23 1400    Urine protein   Negative mg/dL Negative 23 1400          Initial Prenatal Labs     Test Value Reference Range Date Time    Hemoglobin  11.2 g/dL 12.0 - 15.9 23 1145    Hematocrit  33.2 % 34.0 - 46.6 23 1145    Platelets  386 10*3/mm3 140 - 450 23 1145    Rubella IgG  6.80 index Immune >0.99 23 1145    Hepatitis B SAg  Non-Reactive  Non-Reactive 23 1145    Hepatitis C Ab  Non-Reactive  Non-Reactive 23 1145    RPR        T. Pallidum Ab   Non-Reactive  Non-Reactive 23 1145    ABO  O   23 1145    Rh  Positive   23 1145    Antibody Screen  Negative   23 1145    HIV  Non-Reactive  Non-Reactive 23 1145    Urine Culture  50,000 CFU/mL Mixed Kell Isolated   23 1648       >100,000 CFU/mL Escherichia coli   23 1641       25,000 CFU/mL Mixed Kell Isolated   23 1700       >100,000 CFU/mL Escherichia coli   23 1605       >100,000 CFU/mL Escherichia coli   23 1516       >100,000 CFU/mL Escherichia coli   23 1621       >100,000 CFU/mL Escherichia  coli   12/22/22 1345    Gonorrhea  Negative  Negative 12/22/22 1345    Chlamydia  Negative  Negative 12/22/22 1345    TSH  1.650 uIU/mL 0.270 - 4.200 01/11/22 0911    HgB A1c         Varicella IgG        HgB Electrophoresis         Cystic fibrosis               Fetal testing      Test Value Reference Range Date Time    NIPT        AFP-4        MSAFP              2nd and 3rd Trimester     Test Value Reference Range Date Time    Hemoglobin (repeated)  11.7 g/dL 12.0 - 15.9 05/11/23 0908       11.0 g/dL 12.0 - 15.9 04/23/23 2146       10.8 g/dL 12.0 - 15.9 04/19/23 1354       10.8 g/dL 12.0 - 15.9 02/21/23 1607    Hematocrit (repeated)  36.2 % 34.0 - 46.6 05/11/23 0908       33.7 % 34.0 - 46.6 04/23/23 2146       32.1 % 34.0 - 46.6 04/19/23 1354       33.3 % 34.0 - 46.6 02/21/23 1607    Platelets   384 10*3/mm3 140 - 450 05/11/23 0908       382 10*3/mm3 140 - 450 04/23/23 2146       385 10*3/mm3 140 - 450 04/19/23 1354       396 10*3/mm3 140 - 450 02/21/23 1607       386 10*3/mm3 140 - 450 01/05/23 1145    GCT  108 mg/dL 65 - 139 02/21/23 1607    Antibody Screen (repeated)  Negative   01/05/23 1145    GTT Fasting        GTT 1 Hr        GTT 2 Hr        GTT 3 Hr        Group B Strep  Negative  Negative 05/11/23 1458          Other testing      Test Value Reference Range Date Time    [place] IgG         CMV IgG               Drug Screening     Test Value Reference Range Date Time    Amphetamine Screen        Barbiturate Screen        Benzodiazepine Screen        Methadone Screen        Phencyclidine Screen        Opiates Screen        THC Screen        Cocaine Screen        Propoxyphene Screen        Buprenorphine Screen        Methamphetamine Screen        Oxycodone Screen        Tricyclic Antidepressants Screen              Legend    ^: Historical                      External Prenatal Results     Pregnancy Outside Results - Transcribed From Office Records - See Scanned Records For Details     Test Value Date Time    ABO   O  23 1145    Rh  Positive  23 1145    Antibody Screen  Negative  23 1145    Varicella IgG       Rubella  6.80 index 23 1145    Hgb  11.7 g/dL 23 0908       11.0 g/dL 23 2146       10.8 g/dL 23 1354       10.8 g/dL 23 1607       11.2 g/dL 23 1145    Hct  36.2 % 23 0908       33.7 % 23 2146       32.1 % 23 1354       33.3 % 23 1607       33.2 % 23 1145    Glucose Fasting GTT       Glucose Tolerance Test 1 hour       Glucose Tolerance Test 3 hour       Gonorrhea (discrete)  Negative  22 1345    Chlamydia (discrete)  Negative  22 1345    RPR       VDRL       Syphilis Antibody       HBsAg  Non-Reactive  23 1145    Herpes Simplex Virus PCR       Herpes Simplex VIrus Culture       HIV  Non-Reactive  23 1145    Hep C RNA Quant PCR       Hep C Antibody  Non-Reactive  23 1145    AFP       Group B Strep  Negative  23 1458    GBS Susceptibility to Clindamycin       GBS Susceptibility to Erythromycin       Fetal Fibronectin       Genetic Testing, Maternal Blood             Drug Screening     Test Value Date Time    Urine Drug Screen       Amphetamine Screen       Barbiturate Screen       Benzodiazepine Screen       Methadone Screen       Phencyclidine Screen       Opiates Screen       THC Screen       Cocaine Screen       Propoxyphene Screen       Buprenorphine Screen       Methamphetamine Screen       Oxycodone Screen       Tricyclic Antidepressants Screen             Legend    ^: Historical                         Past OB History:     OB History    Para Term  AB Living   2 0 0 0 1 0   SAB IAB Ectopic Molar Multiple Live Births   0 1 0 0 0 0      # Outcome Date GA Lbr Elian/2nd Weight Sex Delivery Anes PTL Lv   2 Current            1 IAB 2017               Past Medical History: Past Medical History:   Diagnosis Date   • Abnormal uterine bleeding    • Anxiety    • Migraine    • Ovarian cyst    •  Urinary tract infection       Past Surgical History No past surgical history on file.   Family History: Family History   Problem Relation Age of Onset   • Hypertension Father    • Prostate cancer Father    • Hypertension Mother    • Diabetes Maternal Grandmother    • Ovarian cancer Neg Hx    • Uterine cancer Neg Hx    • Breast cancer Neg Hx    • Colon cancer Neg Hx    • Melanoma Neg Hx    • Clotting disorder Neg Hx       Social History:  reports that she has never smoked. She has been exposed to tobacco smoke. She has never used smokeless tobacco.   reports that she does not currently use alcohol.   reports no history of drug use.        General ROS: Pertinent items are noted in HPI  Home Medications:  butalbital-acetaminophen-caffeine, cephalexin, cetirizine, and ferrous sulfate    Allergies:  Allergies   Allergen Reactions   • Latex Hives   • Penicillins Hives       Objective       Vital Signs Range for the last 24 hours  Temperature:     Temp Source:     BP: BP: (115-126)/(64-68) 126/64   Pulse: Heart Rate:  [] 82   Respirations:     SPO2: SpO2:  [99 %] 99 %     Physical Examination:   General appearance - alert, well appearing, and in no distress  Mental status - alert, oriented to person, place, and time  Chest - clear to auscultation  Heart - normal rate, regular rhythm,  Abdomen - soft, nontender, nondistended,   Pelvic - cervix 2/50 per RN  Back exam - full range of motion, no tenderness or pain on motion  Neurological - alert, oriented, normal speech  Extremities - no edema  Skin - normal coloration and turgor, no suspicious skin lesions noted    Presentation:    Cervix: Method: sterile exam per RN   Dilation: Cervical Dilation (cm): 2   Effacement: Cervical Effacement: 50%   Station:         Fetal Heart Rate Assessment   Method:  external   Beats/min: 130s-140s   Baseline:  above   Variability:  yes   Accels:  yes   Decels:  no   Tracing Category:  1; reactive     Uterine Assessment   Method:   external   Frequency (min):  every 3-15 minutes                   GBS is negative     Assessment & Plan       Idiopathic scoliosis and kyphoscoliosis    Encounter for supervision of normal pregnancy, antepartum    Iron deficiency anemia during pregnancy    Late prenatal care affecting pregnancy, antepartum    Fetal size inconsistent with dates    Acute intractable headache    Proteinuria affecting pregnancy in third trimester    Irregular contractions        Assessment:  1.  Intrauterine pregnancy at 37w0d gestation with irregular contractions  2.  Obstetrical history - significant for iron deficiency anemia, late prenatal care and now with irregular contractions at term.    Group B Strep, DNA   Date Value Ref Range Status   05/11/2023 Negative Negative Final       Plan:  1. Irregular contractions; cervix unchanged from prior exams. NST reactive  2.  Plan of care has been reviewed with patient and patient agrees.   3.  Risks, benefits of treatment plan have been discussed.  4.  All questions have been answered.        Electronically signed by Niesha Garcia MD, 05/17/23, 4:57 PM EDT.

## 2023-05-17 NOTE — NURSING NOTE
"Dr Garcia at nurse's station. Notified of patient arrival to unit.  at 37.0 weeks gestation. C/O contractions. Patient has been having contractions for \"weeks\" but has gotten worse since last night. Contractions irregular today on monitor. Palpate mild. Reactive tracing. SVE /-3. Dr Garcia to bedside to evaluate patient.   "

## 2023-05-17 NOTE — NURSING NOTE
Discharge instructions explained to patient. Patient verbalized understanding. Patient discharged home.

## 2023-05-19 ENCOUNTER — HOSPITAL ENCOUNTER (OUTPATIENT)
Facility: HOSPITAL | Age: 28
Discharge: HOME | End: 2023-05-19
Attending: OBSTETRICS & GYNECOLOGY | Admitting: OBSTETRICS & GYNECOLOGY
Payer: COMMERCIAL

## 2023-05-19 ENCOUNTER — ROUTINE PRENATAL (OUTPATIENT)
Dept: OBSTETRICS AND GYNECOLOGY | Facility: CLINIC | Age: 28
End: 2023-05-19
Payer: COMMERCIAL

## 2023-05-19 VITALS — DIASTOLIC BLOOD PRESSURE: 73 MMHG | BODY MASS INDEX: 22.11 KG/M2 | WEIGHT: 117 LBS | SYSTOLIC BLOOD PRESSURE: 114 MMHG

## 2023-05-19 VITALS
RESPIRATION RATE: 16 BRPM | HEART RATE: 80 BPM | TEMPERATURE: 98.4 F | SYSTOLIC BLOOD PRESSURE: 126 MMHG | DIASTOLIC BLOOD PRESSURE: 79 MMHG | OXYGEN SATURATION: 100 %

## 2023-05-19 DIAGNOSIS — Z34.90 ENCOUNTER FOR SUPERVISION OF NORMAL PREGNANCY, ANTEPARTUM, UNSPECIFIED GRAVIDITY: Primary | ICD-10-CM

## 2023-05-19 PROBLEM — O47.9 FALSE LABOR: Status: ACTIVE | Noted: 2023-05-19

## 2023-05-19 LAB
BILIRUB BLD-MCNC: NEGATIVE MG/DL
CLARITY, POC: CLEAR
COLOR UR: YELLOW
GLUCOSE UR STRIP-MCNC: ABNORMAL MG/DL
GLUCOSE UR STRIP-MCNC: NEGATIVE MG/DL
KETONES UR QL: ABNORMAL
LEUKOCYTE EST, POC: NEGATIVE
NITRITE UR-MCNC: NEGATIVE MG/ML
PH UR: 8.5 [PH] (ref 5–8)
PROT UR STRIP-MCNC: ABNORMAL MG/DL
PROT UR STRIP-MCNC: ABNORMAL MG/DL
RBC # UR STRIP: NEGATIVE /UL
SP GR UR: 1.02 (ref 1–1.03)
UROBILINOGEN UR QL: ABNORMAL

## 2023-05-19 PROCEDURE — 59025 FETAL NON-STRESS TEST: CPT

## 2023-05-19 PROCEDURE — 81002 URINALYSIS NONAUTO W/O SCOPE: CPT | Performed by: OBSTETRICS & GYNECOLOGY

## 2023-05-19 PROCEDURE — G0463 HOSPITAL OUTPT CLINIC VISIT: HCPCS

## 2023-05-19 NOTE — PROGRESS NOTES
OB FOLLOW UP  CC- Here for care of pregnancy        Jami Gutierrez is a 27 y.o.  37w2d patient being seen today for her obstetrical follow up visit. Patient reports no complaints.     Her prenatal care is complicated by (and status) :    Patient Active Problem List   Diagnosis   • Idiopathic scoliosis and kyphoscoliosis   • Encounter for supervision of normal pregnancy, antepartum   • Iron deficiency anemia during pregnancy   • Late prenatal care affecting pregnancy, antepartum   • Fetal size inconsistent with dates   • Acute intractable headache   • Proteinuria affecting pregnancy in third trimester   • Irregular contractions       Flu Status:   Covid Status:    ROS -   Patient Reports : No Problems  Patient Denies: Loss of Fluid and Contractions  Fetal Movement : normal  All other systems reviewed and are negative.       The additional following portions of the patient's history were reviewed and updated as appropriate: allergies, current medications, past family history, past medical history, past social history, past surgical history and problem list.    I have reviewed and agree with the HPI, ROS, and historical information as entered above. Bijal Quinteros, DO    /73   Wt 53.1 kg (117 lb)   BMI 22.11 kg/m²       EXAM:     FHT: 137 BPM   Uterine Size: 31 cm  Pelvic Exam: No    Urine glucose/protein: See prenatal flowsheet       Assessment and Plan  Diagnoses and all orders for this visit:    1. Encounter for supervision of normal pregnancy, antepartum, unspecified  (Primary)  Overview:  Initial visit:  • PNL:NML  • PAP/GC/CT/Urine culture: > 100k E Coli  • Blood type:O Pos  • Hx of HSV?: No    Genetic testing:    • Considering     Vaccinations:  • COVID:   • Influenza:     24-28 weeks:  • 1hr GCT:  • Hct/Plt:  • Tdap Rx  • Rhogam indicated:      Third Trimester:  • GBS  • Breast pump:    Ultrasound:  • Dating:   • Anatomy: 23:  KENISHA confirmed SIUP + cardiac activity @151. All  anatomy seen and appears nml , Vertex, post placenta Grade 1 male  • Follow up: 5/4/23 EFW: 2368 g (5lb 4oz) 23rd% CARMELO: 11cm, vertex, post placenta grade 1 Male + cardiac activity @141    PLAN:        Orders:  -     POC Urinalysis Dipstick         1. Pregnancy at 37w2d  2. Fetal status reassuring.   3. Activity and Exercise discussed.  4. Return in about 1 week (around 5/26/2023).   5. Kick counts bid  6. Labor precautions    Bijal Quinteros,   05/19/2023

## 2023-05-20 NOTE — PROGRESS NOTES
" [place]  Obstetric History and Physical    Chief Complaint   Patient presents with   • Contractions     Started 2 days ago and \"got worse\" 1 1/2 hours ago.          Subjective:   HPI:    Patient is a 27 y.o. female  currently at 37w2d, who presents with complaint of contractions at worst 6/10 radiating to back every 3 to 5 minutes denies associated rupture membranes or vaginal bleeding..    Her prenatal care is through [facility].  Past OB history is noted below.      The following portions of the patients history were reviewed and updated as appropriate:   current medications, allergies, past medical history, past surgical history, past family history, past social history and current problem list.     Prenatal Information:  Prenatal Results     POC urine glucose/protein      Test Value Reference Range Date Time    Urine glucose  100 mg/dL mg/dL Negative 23    Urine protein   30 mg/dL mg/dL Negative 23          Initial Prenatal Labs     Test Value Reference Range Date Time    Hemoglobin  11.2 g/dL 12.0 - 15.9 23 1145    Hematocrit  33.2 % 34.0 - 46.6 23 1145    Platelets  386 10*3/mm3 140 - 450 23 1145    Rubella IgG  6.80 index Immune >0.99 23 1145    Hepatitis B SAg  Non-Reactive  Non-Reactive 23 1145    Hepatitis C Ab  Non-Reactive  Non-Reactive 23 1145    RPR        T. Pallidum Ab   Non-Reactive  Non-Reactive 23 1145    ABO  O   23 1145    Rh  Positive   23 1145    Antibody Screen  Negative   23 1145    HIV  Non-Reactive  Non-Reactive 23 1145    Urine Culture  50,000 CFU/mL Mixed Kell Isolated   23 1648       >100,000 CFU/mL Escherichia coli   23 1641       25,000 CFU/mL Mixed Kell Isolated   23 1700       >100,000 CFU/mL Escherichia coli   23 1605       >100,000 CFU/mL Escherichia coli   23 1516       >100,000 CFU/mL Escherichia coli   23 1621       >100,000 CFU/mL " Escherichia coli   12/22/22 1345    Gonorrhea  Negative  Negative 12/22/22 1345    Chlamydia  Negative  Negative 12/22/22 1345    TSH  1.650 uIU/mL 0.270 - 4.200 01/11/22 0911    HgB A1c         Varicella IgG        HgB Electrophoresis         Cystic fibrosis               Fetal testing      Test Value Reference Range Date Time    NIPT        AFP-4        MSAFP              2nd and 3rd Trimester     Test Value Reference Range Date Time    Hemoglobin (repeated)  11.7 g/dL 12.0 - 15.9 05/11/23 0908       11.0 g/dL 12.0 - 15.9 04/23/23 2146       10.8 g/dL 12.0 - 15.9 04/19/23 1354       10.8 g/dL 12.0 - 15.9 02/21/23 1607    Hematocrit (repeated)  36.2 % 34.0 - 46.6 05/11/23 0908       33.7 % 34.0 - 46.6 04/23/23 2146       32.1 % 34.0 - 46.6 04/19/23 1354       33.3 % 34.0 - 46.6 02/21/23 1607    Platelets   384 10*3/mm3 140 - 450 05/11/23 0908       382 10*3/mm3 140 - 450 04/23/23 2146       385 10*3/mm3 140 - 450 04/19/23 1354       396 10*3/mm3 140 - 450 02/21/23 1607       386 10*3/mm3 140 - 450 01/05/23 1145    GCT  108 mg/dL 65 - 139 02/21/23 1607    Antibody Screen (repeated)  Negative   01/05/23 1145    GTT Fasting        GTT 1 Hr        GTT 2 Hr        GTT 3 Hr        Group B Strep  Negative  Negative 05/11/23 1458          Other testing      Test Value Reference Range Date Time    Parvo IgG         CMV IgG               Drug Screening     Test Value Reference Range Date Time    Amphetamine Screen        Barbiturate Screen        Benzodiazepine Screen        Methadone Screen        Phencyclidine Screen        Opiates Screen        THC Screen        Cocaine Screen        Propoxyphene Screen        Buprenorphine Screen        Methamphetamine Screen        Oxycodone Screen        Tricyclic Antidepressants Screen              Legend    ^: Historical                      External Prenatal Results     Pregnancy Outside Results - Transcribed From Office Records - See Scanned Records For Details     Test Value Date  Time    ABO  O  23 1145    Rh  Positive  23 1145    Antibody Screen  Negative  23 1145    Varicella IgG       Rubella  6.80 index 23 1145    Hgb  11.7 g/dL 23 0908       11.0 g/dL 23 2146       10.8 g/dL 23 1354       10.8 g/dL 23 1607       11.2 g/dL 23 1145    Hct  36.2 % 23 0908       33.7 % 23 2146       32.1 % 23 1354       33.3 % 23 1607       33.2 % 23 1145    Glucose Fasting GTT       Glucose Tolerance Test 1 hour       Glucose Tolerance Test 3 hour       Gonorrhea (discrete)  Negative  22 1345    Chlamydia (discrete)  Negative  22 1345    RPR       VDRL       Syphilis Antibody       HBsAg  Non-Reactive  23 1145    Herpes Simplex Virus PCR       Herpes Simplex VIrus Culture       HIV  Non-Reactive  23 1145    Hep C RNA Quant PCR       Hep C Antibody  Non-Reactive  23 1145    AFP       Group B Strep  Negative  23 1458    GBS Susceptibility to Clindamycin       GBS Susceptibility to Erythromycin       Fetal Fibronectin       Genetic Testing, Maternal Blood             Drug Screening     Test Value Date Time    Urine Drug Screen       Amphetamine Screen       Barbiturate Screen       Benzodiazepine Screen       Methadone Screen       Phencyclidine Screen       Opiates Screen       THC Screen       Cocaine Screen       Propoxyphene Screen       Buprenorphine Screen       Methamphetamine Screen       Oxycodone Screen       Tricyclic Antidepressants Screen             Legend    ^: Historical                         Past OB History:     OB History    Para Term  AB Living   2 0 0 0 1 0   SAB IAB Ectopic Molar Multiple Live Births   0 1 0 0 0 0      # Outcome Date GA Lbr Elian/2nd Weight Sex Delivery Anes PTL Lv   2 Current            1 IAB 2017               Past Medical History: Past Medical History:   Diagnosis Date   • Abnormal uterine bleeding    • Anxiety    • Migraine    • Ovarian  cyst    • Urinary tract infection       Past Surgical History No past surgical history on file.   Family History: Family History   Problem Relation Age of Onset   • Hypertension Father    • Prostate cancer Father    • Hypertension Mother    • Diabetes Maternal Grandmother    • Ovarian cancer Neg Hx    • Uterine cancer Neg Hx    • Breast cancer Neg Hx    • Colon cancer Neg Hx    • Melanoma Neg Hx    • Clotting disorder Neg Hx       Social History:  reports that she has never smoked. She has been exposed to tobacco smoke. She has never used smokeless tobacco.   reports that she does not currently use alcohol.   reports no history of drug use.        General ROS: Pertinent items are noted in HPI    Home Medications:  butalbital-acetaminophen-caffeine, cephalexin, cetirizine, and ferrous sulfate    Allergies:  Allergies   Allergen Reactions   • Latex Hives   • Penicillins Hives       Objective       Vital Signs Range for the last 24 hours  Temperature: Temp:  [98.4 °F (36.9 °C)] 98.4 °F (36.9 °C)   Temp Source: Temp src: Oral   BP: BP: (114-128)/(73-84) 126/79   Pulse: Heart Rate:  [80-98] 80   Respirations: Resp:  [16] 16   SPO2: SpO2:  [100 %] 100 %     Physical Examination:   General appearance - alert, well appearing, and in no distress  Mental status - alert, oriented to person, place, and time  Chest - clear to auscultation, no wheezes, rales or rhonchi, symmetric air entry  Heart - normal rate, regular rhythm, normal S1, S2, no murmurs, rubs, clicks or gallops  Abdomen - soft, nontender, nondistended, no masses or organomegaly  Pelvic - normal external genitalia, vulva, vagina, cervix, uterus and adnexa  Back exam - full range of motion, no tenderness, palpable spasm or pain on motion  Neurological - alert, oriented, normal speech, no focal findings or movement disorder noted  Extremities - peripheral pulses normal, no pedal edema, no clubbing or cyanosis  Skin - normal coloration and turgor, no rashes, no  suspicious skin lesions noted         Cervix: Method: sterile exam per RN   Dilation: Cervical Dilation (cm): 2 did not make cervical change on serial examination over 2 hours   Effacement: Cervical Effacement: 60%   Station:         Fetal Heart Rate Assessment   Method: Fetal HR Assessment Method: external   Beats/min: Fetal HR (beats/min): 140   Baseline: Fetal HR Baseline: normal range   Variability: Fetal HR Variability: moderate (amplitude range 6 to 25 bpm)   Accels: Fetal HR Accelerations: episodic, greater than/equal to 15 bpm, lasting at least 15 seconds   Decels: Fetal HR Decelerations: absent   Tracing Category:  Category 1 reactive     Uterine Assessment   Method: Method: palpation, external tocotransducer   Frequency (min):     Ctx Count in 10 min:     Duration:     Intensity: Contraction Intensity: other (see comments) (One contraction in 30 miuntes)   [place] Units:        GBS is  negative 5/11/2023    Assessment & Plan       ASSESSMENT with PLAN:   Active Hospital Problems    Diagnosis  POA   • False labor [O47.9]  Unknown     5/19/2023 triage patient presents complaining of contractions as close as every 3 minutes.  Says that they got further apart on arrival.  Not a lot of activity on toco diameter.  She was 2 cm on presentation did not make change over an hour.  After reviewing risk benefits alternative sent home on labor precautions and strict kick counts                 Plan of care has been reviewed with patient and patient agrees.   Risks, benefits of treatment plan have been discussed.  All questions have been answered.        Electronically signed by Victorino Carlton MD, 05/19/23, 10:08 PM EDT.

## 2023-05-20 NOTE — NURSING NOTE
26 YO A1 37 2/7 weeks gestation with c/o contractions for over 2 days worsening over last 1 1/2 hours. Denies vaginal bleeding or leaking of fluid. Reports good fetal movement. Denies h/a, blurred vision or epigastric pain. EFHM applied, see navigator for history.

## 2023-05-26 ENCOUNTER — ROUTINE PRENATAL (OUTPATIENT)
Dept: OBSTETRICS AND GYNECOLOGY | Facility: CLINIC | Age: 28
End: 2023-05-26
Payer: COMMERCIAL

## 2023-05-26 VITALS — WEIGHT: 118 LBS | DIASTOLIC BLOOD PRESSURE: 77 MMHG | BODY MASS INDEX: 22.3 KG/M2 | SYSTOLIC BLOOD PRESSURE: 133 MMHG

## 2023-05-26 DIAGNOSIS — Z34.90 ENCOUNTER FOR SUPERVISION OF NORMAL PREGNANCY, ANTEPARTUM, UNSPECIFIED GRAVIDITY: Primary | ICD-10-CM

## 2023-05-26 LAB
GLUCOSE UR STRIP-MCNC: NEGATIVE MG/DL
PROT UR STRIP-MCNC: NEGATIVE MG/DL

## 2023-05-26 NOTE — PROGRESS NOTES
OB FOLLOW UP  Complaint   Chief Complaint   Patient presents with   • Routine Prenatal Visit            Jami Gutierrez is a 27 y.o.  38w2d patient being seen today for her obstetrical follow up visit. Patient denies decreased fetal movement, contractions, loss of fluid or vaginal bleeding.  Reports increasing dyspnea in pregnancy occasionally feeling flutters in her chest.  No other acute complaints.  To go to the hospital approximate week ago to be evaluated for labor.  Was found to be 2 cm dilated.    Her prenatal care is complicated by (and status) :    Patient Active Problem List   Diagnosis   • Idiopathic scoliosis and kyphoscoliosis   • Encounter for supervision of normal pregnancy, antepartum   • Iron deficiency anemia during pregnancy       All other systems reviewed and are negative.     The additional following portions of the patient's history were reviewed and updated as appropriate: allergies, current medications, past family history, past medical history, past social history, past surgical history and problem list.      EXAM:     Vital signs: /77   Wt 53.5 kg (118 lb)   BMI 22.30 kg/m²   Appearance/psychiatric: To be in no distress  Constitutional: The patient is well nourished.  Cardiovascular: She does not have edema.  Respiratory: Respiratory effort is normal.  Gastrointestinal: Abdomen is soft, gravid, nontender, no rashes, heart tones are present, fundal height is 36 cm    Pelvic Exam: Yes.  Presentation: cephalic. Dilation: 3cm. Effacement: 60. Station: -2.    Urine glucose/protein: See prenatal flowsheet       Assessment and Plan    Problem List Items Addressed This Visit        Gravid and     Encounter for supervision of normal pregnancy, antepartum - Primary    Overview     Initial visit:  • PNL:NML  • PAP/GC/CT/Urine culture: > 100k E Coli  • Blood type:O Pos  • Hx of HSV?: No    Genetic testing:    • Considering     Vaccinations:  • COVID:   • Influenza:      24-28 weeks:  • 1hr GCT:  • Hct/Plt:  • Tdap Rx  • Rhogam indicated:      Third Trimester:  • GBS  • Breast pump:    Ultrasound:  • Dating:   • Anatomy: 1/26/23:  KENISHA confirmed SIUP + cardiac activity @151. All anatomy seen and appears nml , Vertex, post placenta Grade 1 male  • Follow up: 5/4/23 EFW: 2368 g (5lb 4oz) 23rd%  AC (34) CARMELO: 11cm, vertex, post placenta grade 1 Male + cardiac activity @141    PLAN:             Relevant Orders    POC Urinalysis Dipstick (Completed)       Impression  1. Pregnancy at 38w2d  2. Fetal status reassuring.   3. Activity and Exercise discussed.    Plan  1.  Discussed with patient dyspnea in pregnancy as well as chest discomfort.  Discussed if staying consistent unable to carry conversation at rest needs to be evaluated in hospital setting.  Infectious precautions also discussed.  2.  Return to office in 1 week      Patient was counseled to the following pregnancy precautions:  • Decreased fetal movement, if concern for decreased fetal movement please perform fetal kick counts you are looking for 10 movements in 2 hours.  If concern for fetal movement and not meeting that criteria, please present to triage for evaluation.  • Contractions occurring every 5 minutes for over an hour, lasting 30 to 60 seconds and progressively causing more discomfort, please seek medical attention to rule out labor  • If you believe that your water is broken, place a sanitary pad.  If pad fills in short period of time i.e. less than 5 minutes, take off pad placed another pad.  If this is saturated please present for rule out rupture of membranes  • Vaginal bleeding can be normal in pregnancy, this usually takes a form of spotting.  If having heavier bleeding like a menstrual period please present for evaluation; especially in light of severe abdominal pain this could represent a placental abruption.  • Keep all scheduled appointments as recommended.        Caleb Robertson MD  05/26/2023

## 2023-05-28 ENCOUNTER — HOSPITAL ENCOUNTER (OUTPATIENT)
Facility: HOSPITAL | Age: 28
Discharge: HOME | End: 2023-05-28
Attending: OBSTETRICS & GYNECOLOGY | Admitting: OBSTETRICS & GYNECOLOGY

## 2023-05-28 VITALS
HEART RATE: 81 BPM | TEMPERATURE: 98.6 F | DIASTOLIC BLOOD PRESSURE: 74 MMHG | OXYGEN SATURATION: 100 % | SYSTOLIC BLOOD PRESSURE: 125 MMHG | RESPIRATION RATE: 17 BRPM

## 2023-05-28 LAB
BILIRUB BLD-MCNC: NEGATIVE MG/DL
CLARITY, POC: CLEAR
COLOR UR: YELLOW
GLUCOSE UR STRIP-MCNC: ABNORMAL MG/DL
KETONES UR QL: NEGATIVE
LEUKOCYTE EST, POC: ABNORMAL
NITRITE UR-MCNC: NEGATIVE MG/ML
PH UR: 8.5 [PH] (ref 5–8)
PROT UR STRIP-MCNC: NEGATIVE MG/DL
RBC # UR STRIP: NEGATIVE /UL
SP GR UR: 1.01 (ref 1–1.03)
UROBILINOGEN UR QL: ABNORMAL

## 2023-05-28 PROCEDURE — 59025 FETAL NON-STRESS TEST: CPT

## 2023-05-28 PROCEDURE — G0463 HOSPITAL OUTPT CLINIC VISIT: HCPCS

## 2023-05-28 PROCEDURE — 81002 URINALYSIS NONAUTO W/O SCOPE: CPT | Performed by: OBSTETRICS & GYNECOLOGY

## 2023-05-29 NOTE — NURSING NOTE
MD notified of patient arrival.  at 38 4/7 weeks gestation presents with complaints of contractions. States her contractions have intensified since approximately 1730. Baseline FHR, 140 bpm; No decelerations noted. No contractions noted to tracing; No palpable ctx bedside. SVE, 2-3/60/-2. VS, WNL; Reviewed. Urine dip results reviewed; Entered for MD viewing. MD to bedside for evaluation; discuss POC; answer questions.

## 2023-05-29 NOTE — PROGRESS NOTES
Routine Prenatal Visit     Subjective  Jami Gutierrez is a 27 y.o.  at 39w0d here for her routine OB visit.   She is taking her prenatal vitamins.Reports no loss of fluid or vaginal bleeding. Patient doing well without any complaints. Pregnancy complicated by:     Patient Active Problem List   Diagnosis   • Idiopathic scoliosis and kyphoscoliosis   • Encounter for supervision of normal pregnancy, antepartum   • Iron deficiency anemia during pregnancy         OB History    Para Term  AB Living   2       1     SAB IAB Ectopic Molar Multiple Live Births     1              # Outcome Date GA Lbr Elian/2nd Weight Sex Delivery Anes PTL Lv   2 Current            1 IAB 2017                   ROS:   General ROS: negative for - chills or fatigue  Respiratory ROS: negative for - cough or hemoptysis  Cardiovascular ROS: negative for - chest pain or dyspnea on exertion  Genito-Urinary ROS: negative for  change in urinary stream, vaginal discharge   Musculoskeletal ROS: negative for - gait disturbance or joint pain  Dermatological ROS: negative for acne,  dry skin or itching    Objective  Physical Exam:   Vitals:    23 0843   BP: 123/82       Uterine Size: size less than dates  FHT: 110-160 BPM    General appearance - alert, well appearing, and in no distress  Mental status - alert, oriented to person, place, and time  Abdomen- Soft, Gravid uterus, non-tender to palpation  Musculoskeletal: negative for - gait disturbance or joint pain  Extremeties: negative swelling or cyanosis   Dermatological: negative rashes or skin lesions   Pelvic exam: 1-2/70/-3/mid/soft    Assessment/Plan:   Diagnoses and all orders for this visit:    1. Supervision of normal first pregnancy, antepartum (Primary)  Assessment & Plan:    Initial visit:  • PNL:NML  • PAP/GC/CT/Urine culture: > 100k E Coli, urine on  WNL   • Blood type:O Pos  • Hx of HSV?: No    Genetic testing:    • Declined     Vaccinations:  • COVID:  recommended  • Influenza: recommended     24-28 weeks:  • 1hr GCT:108  • Hct/Plt:11.7/36  • Tdap Rx given   • Rhogam indicated:  O positive     Third Trimester:  • GBS negative   • Breast pump:    Ultrasound:  • Anatomy: 1/26/23:  KENISHA confirmed SIUP + cardiac activity @151. All anatomy seen and appears nml , Vertex, post placenta Grade 1 male  • Follow up: 5/4/23 EFW: 2368 g (5lb 4oz) 23rd%  AC (34) CARMELO: 11cm, vertex, post placenta grade 1 Male + cardiac activity @141    PLAN:  UTI-tx with repeat 5/11/ normal   Normal pregnancy       Orders:  -     POC Urinalysis Dipstick          Counseling:   OB precautions, leaking, VB, chetna valentin vs PTL/Labor  FKC  HTN precautions reviewed: HA, vision change, RUQ/epigastric pain, edema  IOL scheduled  IOL reviewed in detail.  R/B/A/SE/E.  All history reviewed and updated.  Pre-IOL exam performed.  Length can be 24-48+hrs.  PLAN: pitocin .  All questions answered.  She desires to proceed as planned.  Round Ligament Pain:  The uterus has several ligaments which provide support and keep the uterus in place. As the  uterus grows these ligaments are pulled and stretched which often causes sharp stabbing like pain in the inguinal area.   You may find a pregnancy support band helpful. Changing positions may also help. Yoga is a great way to cope with round ligament and low back pain in pregnancy.    Massage may also help with low back pain   Things to Consider at this Point in your Pregnancy:  Some women experience swelling in their feet during pregnancy. Compression stockings may help  Drink plenty of water and stay active   Make sure you are eating frequent small meals, nuts are a wonderful snack to keep with you            Return in about 7 weeks (around 7/19/2023) for Postpartum visit.      We have gone over prenatal care to include the timing and content of visits. I informed her how to contact the office and/or on call person in the event of any problems and encouraged her to  do so when she feels it is necessary.  We then spent time answering her questions which she indicated were answered to her satisfaction.    Yohana Paul DO  5/31/2023 08:56 EDT

## 2023-05-29 NOTE — H&P
[facility]  Obstetric History and Physical    Chief Complaint   Patient presents with   • Contractions       Subjective     HPI:    Patient is a 27 y.o. female  currently at 38w4d, who presents to OB ED for contractions. She states they became more intense this evening. She reports good fetal movement. Denies bloody show, leaking fluid, significant vaginal discharge, dysuria. She does  Have some frequency.    Her prenatal care is benign.           The following portions of the patients history were reviewed and updated as appropriate:   current medications, allergies, past medical history, past surgical history, past family history, past social history and current problem list.     Prenatal Information:  Prenatal Results     POC urine glucose/protein      Test Value Reference Range Date Time    Urine glucose  100 mg/dL mg/dL Negative 23    Urine protein   Negative mg/dL Negative 23 204          Initial Prenatal Labs     Test Value Reference Range Date Time    Hemoglobin  11.2 g/dL 12.0 - 15.9 23 1145    Hematocrit  33.2 % 34.0 - 46.6 23 1145    Platelets  386 10*3/mm3 140 - 450 23 1145    Rubella IgG  6.80 index Immune >0.99 23 1145    Hepatitis B SAg  Non-Reactive  Non-Reactive 23 1145    Hepatitis C Ab  Non-Reactive  Non-Reactive 23 1145    RPR        T. Pallidum Ab   Non-Reactive  Non-Reactive 23 1145    ABO  O   23 1145    Rh  Positive   23 1145    Antibody Screen  Negative   23 1145    HIV  Non-Reactive  Non-Reactive 23 1145    Urine Culture  50,000 CFU/mL Mixed Kell Isolated   23 1648       >100,000 CFU/mL Escherichia coli   23 1641       25,000 CFU/mL Mixed Kell Isolated   23 1700       >100,000 CFU/mL Escherichia coli   23 1605       >100,000 CFU/mL Escherichia coli   23 1516       >100,000 CFU/mL Escherichia coli   23 1621       >100,000 CFU/mL Escherichia coli   22 1345     Gonorrhea  Negative  Negative 12/22/22 1345    Chlamydia  Negative  Negative 12/22/22 1345    TSH  1.650 uIU/mL 0.270 - 4.200 01/11/22 0911    HgB A1c         Varicella IgG        HgB Electrophoresis         Cystic fibrosis               Fetal testing      Test Value Reference Range Date Time    NIPT        AFP-4        MSAFP              2nd and 3rd Trimester     Test Value Reference Range Date Time    Hemoglobin (repeated)  11.7 g/dL 12.0 - 15.9 05/11/23 0908       11.0 g/dL 12.0 - 15.9 04/23/23 2146       10.8 g/dL 12.0 - 15.9 04/19/23 1354       10.8 g/dL 12.0 - 15.9 02/21/23 1607    Hematocrit (repeated)  36.2 % 34.0 - 46.6 05/11/23 0908       33.7 % 34.0 - 46.6 04/23/23 2146       32.1 % 34.0 - 46.6 04/19/23 1354       33.3 % 34.0 - 46.6 02/21/23 1607    Platelets   384 10*3/mm3 140 - 450 05/11/23 0908       382 10*3/mm3 140 - 450 04/23/23 2146       385 10*3/mm3 140 - 450 04/19/23 1354       396 10*3/mm3 140 - 450 02/21/23 1607       386 10*3/mm3 140 - 450 01/05/23 1145    GCT  108 mg/dL 65 - 139 02/21/23 1607    Antibody Screen (repeated)  Negative   01/05/23 1145    GTT Fasting        GTT 1 Hr        GTT 2 Hr        GTT 3 Hr        Group B Strep  Negative  Negative 05/11/23 1458          Other testing      Test Value Reference Range Date Time    Parvo IgG         CMV IgG               Drug Screening     Test Value Reference Range Date Time    Amphetamine Screen        Barbiturate Screen        Benzodiazepine Screen        Methadone Screen        Phencyclidine Screen        Opiates Screen        THC Screen        Cocaine Screen        Propoxyphene Screen        Buprenorphine Screen        Methamphetamine Screen        Oxycodone Screen        Tricyclic Antidepressants Screen              Legend    ^: Historical                      External Prenatal Results     Pregnancy Outside Results - Transcribed From Office Records - See Scanned Records For Details     Test Value Date Time    ABO  O  01/05/23 1145    Rh   Positive  23 1145    Antibody Screen  Negative  23 1145    Varicella IgG       Rubella  6.80 index 23 1145    Hgb  11.7 g/dL 23 0908       11.0 g/dL 23 2146       10.8 g/dL 23 1354       10.8 g/dL 23 1607       11.2 g/dL 23 1145    Hct  36.2 % 23 0908       33.7 % 23 2146       32.1 % 23 1354       33.3 % 23 1607       33.2 % 23 1145    Glucose Fasting GTT       Glucose Tolerance Test 1 hour       Glucose Tolerance Test 3 hour       Gonorrhea (discrete)  Negative  22 1345    Chlamydia (discrete)  Negative  22 1345    RPR       VDRL       Syphilis Antibody       HBsAg  Non-Reactive  23 1145    Herpes Simplex Virus PCR       Herpes Simplex VIrus Culture       HIV  Non-Reactive  23 1145    Hep C RNA Quant PCR       Hep C Antibody  Non-Reactive  23 1145    AFP       Group B Strep  Negative  23 1458    GBS Susceptibility to Clindamycin       GBS Susceptibility to Erythromycin       Fetal Fibronectin       Genetic Testing, Maternal Blood             Drug Screening     Test Value Date Time    Urine Drug Screen       Amphetamine Screen       Barbiturate Screen       Benzodiazepine Screen       Methadone Screen       Phencyclidine Screen       Opiates Screen       THC Screen       Cocaine Screen       Propoxyphene Screen       Buprenorphine Screen       Methamphetamine Screen       Oxycodone Screen       Tricyclic Antidepressants Screen             Legend    ^: Historical                         Past OB History:     OB History    Para Term  AB Living   2 0 0 0 1 0   SAB IAB Ectopic Molar Multiple Live Births   0 1 0 0 0 0      # Outcome Date GA Lbr Elian/2nd Weight Sex Delivery Anes PTL Lv   2 Current            1 IAB 2017               Past Medical History: Past Medical History:   Diagnosis Date   • Abnormal uterine bleeding    • Anxiety    • Migraine    • Ovarian cyst    • Urinary tract infection        Past Surgical History No past surgical history on file.   Family History: Family History   Problem Relation Age of Onset   • Hypertension Father    • Prostate cancer Father    • Hypertension Mother    • Diabetes Maternal Grandmother    • Ovarian cancer Neg Hx    • Uterine cancer Neg Hx    • Breast cancer Neg Hx    • Colon cancer Neg Hx    • Melanoma Neg Hx    • Clotting disorder Neg Hx       Social History:  reports that she has never smoked. She has been exposed to tobacco smoke. She has never used smokeless tobacco.   reports that she does not currently use alcohol.   reports no history of drug use.        General ROS: Pertinent items are noted in HPI  Home Medications:  butalbital-acetaminophen-caffeine, cetirizine, and ferrous sulfate    Allergies:  Allergies   Allergen Reactions   • Latex Hives   • Penicillins Hives       Objective       Vital Signs Range for the last 24 hours  Temperature: Temp:  [98.6 °F (37 °C)] 98.6 °F (37 °C)   Temp Source: Temp src: Oral   BP: BP: (130)/(73) 130/73   Pulse: Heart Rate:  [76] 76   Respirations: Resp:  [20] 20   SPO2: SpO2:  [91 %] 91 %     Physical Examination:   General appearance - alert, well appearing, and in no distress  Mental status - alert, oriented to person, place, and time  Chest - clear to auscultation  Heart - normal rate, regular rhythm, II/VI ALINA  Abdomen - soft, nontender, nondistended,   Pelvic - deferred; SVE per RN 2-3/60/-2  Back exam - full range of motion, no tenderness or pain on motion  Neurological - alert, oriented, normal speech  Extremities - Edema none  Skin - normal coloration and turgor, no suspicious skin lesions noted   Loami - no contractions seen on external monitor   FHR - 140s with [id number]; no decels; NST category 1    GBS is negative     Assessment & Plan     Contractions  False Labor      Assessment:  -  Intrauterine pregnancy at 38w4d gestation who presents for: contractions. reassuring fetal status.    -  labor  false  -   Obstetrical history significant for is non-contributory.  -  GBS status:   Group B Strep, DNA   Date Value Ref Range Status   05/11/2023 Negative Negative Final       Plan:  - Discharge to home.  Counseled to return with worsening contractions, leakage of fluid, decreased fetal movement, vaginal bleeding.   - Plan of care has been reviewed with patient and patient agrees.   - Risks, benefits of treatment plan have been discussed.  - All questions have been answered.        Electronically signed by Niesha Garcia MD, 05/28/23, 8:58 PM EDT.

## 2023-05-29 NOTE — NON STRESS TEST
Obstetrical Non-stress Test Interpretation     Name:  Jami Gutierrez  MRN: [id number]    27 y.o. female  at 38w4d    Indication: Contractions      Fetal Assessment  Fetal Movement: active  Fetal HR Assessment Method: external  Fetal HR (beats/min): 140  Fetal HR Baseline: normal range  Fetal HR Variability: moderate (amplitude range 6 to 25 bpm)  Fetal HR Accelerations: greater than/equal to 15 bpm, lasting at least 15 seconds  Fetal HR Decelerations: absent  Sinusoidal Pattern Present: absent    /74 (BP Location: Right arm, Patient Position: Sitting)   Pulse 81   Temp 98.6 °F (37 °C) (Oral)   Resp 17   SpO2 100%     Reason for test: Contractions  Date of Test: 2023  Time frame of test: 8550-5712  RN NST Interpretation: Reactive      Angela Meraz RN  2023  21:50 EDT    Strip reviewed,  I agree with above note    Niesha Garcia MD  23  07:10 EDT

## 2023-05-29 NOTE — ASSESSMENT & PLAN NOTE
Initial visit:  • PNL:NML  • PAP/GC/CT/Urine culture: > 100k E Coli, urine on 5/11 WNL   • Blood type:O Pos  • Hx of HSV?: No    Genetic testing:    • Declined     Vaccinations:  • COVID: recommended  • Influenza: recommended     24-28 weeks:  • 1hr GCT:108  • Hct/Plt:11.7/36  • Tdap Rx given   • Rhogam indicated:  O positive     Third Trimester:  • GBS negative   • Breast pump:    Ultrasound:  • Anatomy: 1/26/23:  KENISHA confirmed SIUP + cardiac activity @151. All anatomy seen and appears nml , Vertex, post placenta Grade 1 male  • Follow up: 5/4/23 EFW: 2368 g (5lb 4oz) 23rd%  AC (34) CARMELO: 11cm, vertex, post placenta grade 1 Male + cardiac activity @141    PLAN:  UTI-tx with repeat 5/11/ normal   Normal pregnancy

## 2023-05-29 NOTE — DISCHARGE INSTR - OTHER ORDERS
Keep next next scheduled appointment in office at [facility] with Dr. Paul on May 31, 2023 at 0845.

## 2023-05-29 NOTE — NURSING NOTE
Discharge order received; Instruction sheet printed; reviewed. Second trimester education provided; reviewed. Routine labor precautions reviewed. Instructed to keep next scheduled appointment in office with Dr. Paul 5/31/23. Verbalizes understanding. Patient ambulating, undelivered, in stable condition upon discharge home.

## 2023-05-31 ENCOUNTER — PREP FOR SURGERY (OUTPATIENT)
Dept: OTHER | Facility: HOSPITAL | Age: 28
End: 2023-05-31

## 2023-05-31 ENCOUNTER — ROUTINE PRENATAL (OUTPATIENT)
Dept: OBSTETRICS AND GYNECOLOGY | Facility: CLINIC | Age: 28
End: 2023-05-31

## 2023-05-31 VITALS — BODY MASS INDEX: 22.3 KG/M2 | DIASTOLIC BLOOD PRESSURE: 82 MMHG | WEIGHT: 118 LBS | SYSTOLIC BLOOD PRESSURE: 123 MMHG

## 2023-05-31 DIAGNOSIS — Z34.00 SUPERVISION OF NORMAL FIRST PREGNANCY, ANTEPARTUM: Primary | ICD-10-CM

## 2023-05-31 LAB
GLUCOSE UR STRIP-MCNC: NEGATIVE MG/DL
PROT UR STRIP-MCNC: NEGATIVE MG/DL

## 2023-05-31 RX ORDER — MISOPROSTOL 200 UG/1
800 TABLET ORAL AS NEEDED
Status: CANCELLED | OUTPATIENT
Start: 2023-05-31

## 2023-05-31 RX ORDER — TERBUTALINE SULFATE 1 MG/ML
0.25 INJECTION, SOLUTION SUBCUTANEOUS AS NEEDED
Status: CANCELLED | OUTPATIENT
Start: 2023-05-31

## 2023-05-31 RX ORDER — ONDANSETRON 4 MG/1
4 TABLET, FILM COATED ORAL EVERY 6 HOURS PRN
Status: CANCELLED | OUTPATIENT
Start: 2023-05-31

## 2023-05-31 RX ORDER — LIDOCAINE HYDROCHLORIDE 10 MG/ML
5 INJECTION, SOLUTION EPIDURAL; INFILTRATION; INTRACAUDAL; PERINEURAL AS NEEDED
Status: CANCELLED | OUTPATIENT
Start: 2023-05-31

## 2023-05-31 RX ORDER — SODIUM CHLORIDE 0.9 % (FLUSH) 0.9 %
3 SYRINGE (ML) INJECTION EVERY 12 HOURS SCHEDULED
Status: CANCELLED | OUTPATIENT
Start: 2023-05-31

## 2023-05-31 RX ORDER — FAMOTIDINE 10 MG/ML
20 INJECTION, SOLUTION INTRAVENOUS 2 TIMES DAILY PRN
Status: CANCELLED | OUTPATIENT
Start: 2023-05-31

## 2023-05-31 RX ORDER — PROMETHAZINE HYDROCHLORIDE 12.5 MG/1
12.5 TABLET ORAL EVERY 6 HOURS PRN
Status: CANCELLED | OUTPATIENT
Start: 2023-05-31

## 2023-05-31 RX ORDER — ACETAMINOPHEN 325 MG/1
650 TABLET ORAL EVERY 4 HOURS PRN
Status: CANCELLED | OUTPATIENT
Start: 2023-05-31

## 2023-05-31 RX ORDER — METOCLOPRAMIDE HYDROCHLORIDE 5 MG/ML
10 INJECTION INTRAMUSCULAR; INTRAVENOUS ONCE AS NEEDED
Status: CANCELLED | OUTPATIENT
Start: 2023-05-31

## 2023-05-31 RX ORDER — FAMOTIDINE 20 MG/1
20 TABLET, FILM COATED ORAL 2 TIMES DAILY PRN
Status: CANCELLED | OUTPATIENT
Start: 2023-05-31

## 2023-05-31 RX ORDER — ONDANSETRON 2 MG/ML
4 INJECTION INTRAMUSCULAR; INTRAVENOUS EVERY 6 HOURS PRN
Status: CANCELLED | OUTPATIENT
Start: 2023-05-31

## 2023-05-31 RX ORDER — HYDROCODONE BITARTRATE AND ACETAMINOPHEN 5; 325 MG/1; MG/1
1 TABLET ORAL EVERY 4 HOURS PRN
Status: CANCELLED | OUTPATIENT
Start: 2023-05-31 | End: 2023-06-07

## 2023-05-31 RX ORDER — ACETAMINOPHEN 325 MG/1
650 TABLET ORAL ONCE AS NEEDED
Status: CANCELLED | OUTPATIENT
Start: 2023-05-31

## 2023-05-31 RX ORDER — IBUPROFEN 800 MG/1
800 TABLET ORAL ONCE AS NEEDED
Status: CANCELLED | OUTPATIENT
Start: 2023-05-31

## 2023-05-31 RX ORDER — FAMOTIDINE 20 MG/1
20 TABLET, FILM COATED ORAL ONCE AS NEEDED
Status: CANCELLED | OUTPATIENT
Start: 2023-05-31

## 2023-05-31 RX ORDER — METHYLERGONOVINE MALEATE 0.2 MG/ML
200 INJECTION INTRAVENOUS ONCE AS NEEDED
Status: CANCELLED | OUTPATIENT
Start: 2023-05-31

## 2023-05-31 RX ORDER — PROMETHAZINE HYDROCHLORIDE 25 MG/1
25 TABLET ORAL EVERY 6 HOURS PRN
Status: CANCELLED | OUTPATIENT
Start: 2023-05-31

## 2023-05-31 RX ORDER — OXYTOCIN 10 [USP'U]/ML
10 INJECTION, SOLUTION INTRAMUSCULAR; INTRAVENOUS ONCE
Status: CANCELLED | OUTPATIENT
Start: 2023-05-31 | End: 2023-05-31

## 2023-05-31 RX ORDER — SODIUM CHLORIDE 0.9 % (FLUSH) 0.9 %
10 SYRINGE (ML) INJECTION AS NEEDED
Status: CANCELLED | OUTPATIENT
Start: 2023-05-31

## 2023-05-31 RX ORDER — FAMOTIDINE 10 MG/ML
20 INJECTION, SOLUTION INTRAVENOUS ONCE AS NEEDED
Status: CANCELLED | OUTPATIENT
Start: 2023-05-31

## 2023-05-31 RX ORDER — HYDROCODONE BITARTRATE AND ACETAMINOPHEN 10; 325 MG/1; MG/1
1 TABLET ORAL EVERY 4 HOURS PRN
Status: CANCELLED | OUTPATIENT
Start: 2023-05-31 | End: 2023-06-07

## 2023-05-31 RX ORDER — TRISODIUM CITRATE DIHYDRATE AND CITRIC ACID MONOHYDRATE 500; 334 MG/5ML; MG/5ML
30 SOLUTION ORAL ONCE AS NEEDED
Status: CANCELLED | OUTPATIENT
Start: 2023-05-31

## 2023-05-31 RX ORDER — MAGNESIUM CARB/ALUMINUM HYDROX 105-160MG
30 TABLET,CHEWABLE ORAL ONCE
Status: CANCELLED | OUTPATIENT
Start: 2023-05-31 | End: 2023-05-31

## 2023-05-31 RX ORDER — SODIUM CHLORIDE, SODIUM LACTATE, POTASSIUM CHLORIDE, CALCIUM CHLORIDE 600; 310; 30; 20 MG/100ML; MG/100ML; MG/100ML; MG/100ML
125 INJECTION, SOLUTION INTRAVENOUS CONTINUOUS
Status: CANCELLED | OUTPATIENT
Start: 2023-05-31

## 2023-06-04 ENCOUNTER — ANESTHESIA (OUTPATIENT)
Dept: LABOR AND DELIVERY | Facility: HOSPITAL | Age: 28
End: 2023-06-04
Payer: COMMERCIAL

## 2023-06-04 ENCOUNTER — HOSPITAL ENCOUNTER (INPATIENT)
Facility: HOSPITAL | Age: 28
LOS: 2 days | Discharge: HOME | End: 2023-06-06
Attending: OBSTETRICS & GYNECOLOGY | Admitting: OBSTETRICS & GYNECOLOGY
Payer: COMMERCIAL

## 2023-06-04 ENCOUNTER — ANESTHESIA EVENT (OUTPATIENT)
Dept: LABOR AND DELIVERY | Facility: HOSPITAL | Age: 28
End: 2023-06-04
Payer: COMMERCIAL

## 2023-06-04 DIAGNOSIS — Z34.00 SUPERVISION OF NORMAL FIRST PREGNANCY, ANTEPARTUM: ICD-10-CM

## 2023-06-04 PROBLEM — Z37.9 NORMAL LABOR: Status: ACTIVE | Noted: 2023-06-04

## 2023-06-04 PROBLEM — Z34.90 ENCOUNTER FOR INDUCTION OF LABOR: Status: ACTIVE | Noted: 2023-06-04

## 2023-06-04 LAB
A1 MICROGLOB PLACENTAL VAG QL: POSITIVE
ABO GROUP BLD: NORMAL
BASE EXCESS BLDCOA CALC-SCNC: -6.3 MMOL/L (ref -2–2)
BASE EXCESS BLDCOV CALC-SCNC: -6.1 MMOL/L (ref -2–2)
BLD GP AB SCN SERPL QL: NEGATIVE
DEPRECATED RDW RBC AUTO: 40.7 FL (ref 37–54)
ERYTHROCYTE [DISTWIDTH] IN BLOOD BY AUTOMATED COUNT: 13.5 % (ref 12.3–15.4)
HCO3 BLDCOA-SCNC: 20.9 MMOL/L
HCO3 BLDCOV-SCNC: 23 MMOL/L
HCT VFR BLD AUTO: 31.4 % (ref 34–46.6)
HGB BLD-MCNC: 10.1 G/DL (ref 12–15.9)
MCH RBC QN AUTO: 26.8 PG (ref 26.6–33)
MCHC RBC AUTO-ENTMCNC: 32.2 G/DL (ref 31.5–35.7)
MCV RBC AUTO: 83.3 FL (ref 79–97)
PCO2 BLDCOA: 47.9 MMHG (ref 33–49)
PCO2 BLDCOV: 60.5 MM HG (ref 28–40)
PH BLDCOA: 7.26 PH UNITS (ref 7.21–7.31)
PH BLDCOV: 7.2 PH UNITS (ref 7.31–7.37)
PLATELET # BLD AUTO: 366 10*3/MM3 (ref 140–450)
PMV BLD AUTO: 10 FL (ref 6–12)
PO2 BLDCOA: <40.5 MMHG
PO2 BLDCOV: <40.5 MM HG (ref 21–31)
RBC # BLD AUTO: 3.77 10*6/MM3 (ref 3.77–5.28)
RH BLD: POSITIVE
T&S EXPIRATION DATE: NORMAL
WBC NRBC COR # BLD: 7.35 10*3/MM3 (ref 3.4–10.8)

## 2023-06-04 PROCEDURE — 86901 BLOOD TYPING SEROLOGIC RH(D): CPT | Performed by: OBSTETRICS & GYNECOLOGY

## 2023-06-04 PROCEDURE — 25010000002 ONDANSETRON PER 1 MG: Performed by: OBSTETRICS & GYNECOLOGY

## 2023-06-04 PROCEDURE — 86900 BLOOD TYPING SEROLOGIC ABO: CPT | Performed by: OBSTETRICS & GYNECOLOGY

## 2023-06-04 PROCEDURE — 25010000002 FENTANYL CITRATE (PF) 50 MCG/ML SOLUTION: Performed by: ANESTHESIOLOGY

## 2023-06-04 PROCEDURE — 84112 EVAL AMNIOTIC FLUID PROTEIN: CPT | Performed by: OBSTETRICS & GYNECOLOGY

## 2023-06-04 PROCEDURE — 86850 RBC ANTIBODY SCREEN: CPT | Performed by: OBSTETRICS & GYNECOLOGY

## 2023-06-04 PROCEDURE — 85027 COMPLETE CBC AUTOMATED: CPT | Performed by: OBSTETRICS & GYNECOLOGY

## 2023-06-04 PROCEDURE — C1755 CATHETER, INTRASPINAL: HCPCS | Performed by: ANESTHESIOLOGY

## 2023-06-04 PROCEDURE — 82803 BLOOD GASES ANY COMBINATION: CPT | Performed by: OBSTETRICS & GYNECOLOGY

## 2023-06-04 RX ORDER — OXYTOCIN/0.9 % SODIUM CHLORIDE 30/500 ML
999 PLASTIC BAG, INJECTION (ML) INTRAVENOUS ONCE
Status: DISCONTINUED | OUTPATIENT
Start: 2023-06-04 | End: 2023-06-06 | Stop reason: HOSPADM

## 2023-06-04 RX ORDER — IBUPROFEN 800 MG/1
800 TABLET ORAL ONCE AS NEEDED
Status: DISCONTINUED | OUTPATIENT
Start: 2023-06-04 | End: 2023-06-04 | Stop reason: HOSPADM

## 2023-06-04 RX ORDER — FAMOTIDINE 10 MG/ML
20 INJECTION, SOLUTION INTRAVENOUS ONCE AS NEEDED
Status: DISCONTINUED | OUTPATIENT
Start: 2023-06-04 | End: 2023-06-04 | Stop reason: HOSPADM

## 2023-06-04 RX ORDER — HYDROCODONE BITARTRATE AND ACETAMINOPHEN 5; 325 MG/1; MG/1
1 TABLET ORAL EVERY 4 HOURS PRN
Status: DISCONTINUED | OUTPATIENT
Start: 2023-06-04 | End: 2023-06-06 | Stop reason: HOSPADM

## 2023-06-04 RX ORDER — FAMOTIDINE 20 MG/1
20 TABLET, FILM COATED ORAL ONCE AS NEEDED
Status: DISCONTINUED | OUTPATIENT
Start: 2023-06-04 | End: 2023-06-04 | Stop reason: HOSPADM

## 2023-06-04 RX ORDER — ONDANSETRON 2 MG/ML
4 INJECTION INTRAMUSCULAR; INTRAVENOUS EVERY 6 HOURS PRN
Status: DISCONTINUED | OUTPATIENT
Start: 2023-06-04 | End: 2023-06-04 | Stop reason: HOSPADM

## 2023-06-04 RX ORDER — TERBUTALINE SULFATE 1 MG/ML
0.25 INJECTION, SOLUTION SUBCUTANEOUS AS NEEDED
Status: DISCONTINUED | OUTPATIENT
Start: 2023-06-04 | End: 2023-06-04 | Stop reason: HOSPADM

## 2023-06-04 RX ORDER — FENTANYL CITRATE 50 UG/ML
INJECTION, SOLUTION INTRAMUSCULAR; INTRAVENOUS
Status: COMPLETED | OUTPATIENT
Start: 2023-06-04 | End: 2023-06-04

## 2023-06-04 RX ORDER — ACETAMINOPHEN 325 MG/1
650 TABLET ORAL EVERY 4 HOURS PRN
Status: DISCONTINUED | OUTPATIENT
Start: 2023-06-04 | End: 2023-06-04 | Stop reason: HOSPADM

## 2023-06-04 RX ORDER — ACETAMINOPHEN 325 MG/1
650 TABLET ORAL ONCE AS NEEDED
Status: DISCONTINUED | OUTPATIENT
Start: 2023-06-04 | End: 2023-06-04 | Stop reason: HOSPADM

## 2023-06-04 RX ORDER — LIDOCAINE HYDROCHLORIDE AND EPINEPHRINE 15; 5 MG/ML; UG/ML
INJECTION, SOLUTION EPIDURAL
Status: COMPLETED | OUTPATIENT
Start: 2023-06-04 | End: 2023-06-04

## 2023-06-04 RX ORDER — ONDANSETRON 4 MG/1
4 TABLET, FILM COATED ORAL EVERY 6 HOURS PRN
Status: DISCONTINUED | OUTPATIENT
Start: 2023-06-04 | End: 2023-06-04 | Stop reason: HOSPADM

## 2023-06-04 RX ORDER — ACETAMINOPHEN 325 MG/1
650 TABLET ORAL EVERY 6 HOURS PRN
Status: DISCONTINUED | OUTPATIENT
Start: 2023-06-04 | End: 2023-06-06 | Stop reason: HOSPADM

## 2023-06-04 RX ORDER — MAGNESIUM CARB/ALUMINUM HYDROX 105-160MG
30 TABLET,CHEWABLE ORAL ONCE
Status: DISCONTINUED | OUTPATIENT
Start: 2023-06-04 | End: 2023-06-04 | Stop reason: SDUPTHER

## 2023-06-04 RX ORDER — SODIUM CHLORIDE 0.9 % (FLUSH) 0.9 %
3 SYRINGE (ML) INJECTION EVERY 12 HOURS SCHEDULED
Status: DISCONTINUED | OUTPATIENT
Start: 2023-06-04 | End: 2023-06-04 | Stop reason: HOSPADM

## 2023-06-04 RX ORDER — TERBUTALINE SULFATE 1 MG/ML
0.25 INJECTION, SOLUTION SUBCUTANEOUS AS NEEDED
Status: DISCONTINUED | OUTPATIENT
Start: 2023-06-04 | End: 2023-06-04 | Stop reason: SDUPTHER

## 2023-06-04 RX ORDER — LIDOCAINE HYDROCHLORIDE 10 MG/ML
5 INJECTION, SOLUTION EPIDURAL; INFILTRATION; INTRACAUDAL; PERINEURAL AS NEEDED
Status: DISCONTINUED | OUTPATIENT
Start: 2023-06-04 | End: 2023-06-04 | Stop reason: HOSPADM

## 2023-06-04 RX ORDER — EPHEDRINE SULFATE 50 MG/ML
5 INJECTION, SOLUTION INTRAVENOUS
Status: DISCONTINUED | OUTPATIENT
Start: 2023-06-04 | End: 2023-06-04 | Stop reason: SDUPTHER

## 2023-06-04 RX ORDER — OXYTOCIN 10 [USP'U]/ML
10 INJECTION, SOLUTION INTRAMUSCULAR; INTRAVENOUS ONCE
Status: DISCONTINUED | OUTPATIENT
Start: 2023-06-04 | End: 2023-06-04 | Stop reason: HOSPADM

## 2023-06-04 RX ORDER — PROMETHAZINE HYDROCHLORIDE 25 MG/1
25 TABLET ORAL EVERY 6 HOURS PRN
Status: DISCONTINUED | OUTPATIENT
Start: 2023-06-04 | End: 2023-06-04 | Stop reason: HOSPADM

## 2023-06-04 RX ORDER — METOCLOPRAMIDE HYDROCHLORIDE 5 MG/ML
10 INJECTION INTRAMUSCULAR; INTRAVENOUS ONCE AS NEEDED
Status: DISCONTINUED | OUTPATIENT
Start: 2023-06-04 | End: 2023-06-04 | Stop reason: HOSPADM

## 2023-06-04 RX ORDER — DOCUSATE SODIUM 100 MG/1
100 CAPSULE, LIQUID FILLED ORAL DAILY
Status: DISCONTINUED | OUTPATIENT
Start: 2023-06-04 | End: 2023-06-06 | Stop reason: HOSPADM

## 2023-06-04 RX ORDER — FAMOTIDINE 10 MG/ML
20 INJECTION, SOLUTION INTRAVENOUS ONCE AS NEEDED
Status: DISCONTINUED | OUTPATIENT
Start: 2023-06-04 | End: 2023-06-04 | Stop reason: SDUPTHER

## 2023-06-04 RX ORDER — SODIUM CHLORIDE, SODIUM LACTATE, POTASSIUM CHLORIDE, CALCIUM CHLORIDE 600; 310; 30; 20 MG/100ML; MG/100ML; MG/100ML; MG/100ML
125 INJECTION, SOLUTION INTRAVENOUS CONTINUOUS
Status: DISCONTINUED | OUTPATIENT
Start: 2023-06-04 | End: 2023-06-04

## 2023-06-04 RX ORDER — MISOPROSTOL 200 UG/1
800 TABLET ORAL AS NEEDED
Status: DISCONTINUED | OUTPATIENT
Start: 2023-06-04 | End: 2023-06-04 | Stop reason: HOSPADM

## 2023-06-04 RX ORDER — FERROUS SULFATE 325(65) MG
325 TABLET ORAL
Status: DISCONTINUED | OUTPATIENT
Start: 2023-06-05 | End: 2023-06-06 | Stop reason: HOSPADM

## 2023-06-04 RX ORDER — SODIUM CHLORIDE 0.9 % (FLUSH) 0.9 %
10 SYRINGE (ML) INJECTION AS NEEDED
Status: DISCONTINUED | OUTPATIENT
Start: 2023-06-04 | End: 2023-06-04 | Stop reason: HOSPADM

## 2023-06-04 RX ORDER — OXYTOCIN/0.9 % SODIUM CHLORIDE 30/500 ML
250 PLASTIC BAG, INJECTION (ML) INTRAVENOUS CONTINUOUS
Status: ACTIVE | OUTPATIENT
Start: 2023-06-04 | End: 2023-06-04

## 2023-06-04 RX ORDER — LIDOCAINE HYDROCHLORIDE AND EPINEPHRINE 15; 5 MG/ML; UG/ML
INJECTION, SOLUTION EPIDURAL
Status: COMPLETED
Start: 2023-06-04 | End: 2023-06-04

## 2023-06-04 RX ORDER — HYDROCODONE BITARTRATE AND ACETAMINOPHEN 10; 325 MG/1; MG/1
1 TABLET ORAL EVERY 4 HOURS PRN
Status: DISCONTINUED | OUTPATIENT
Start: 2023-06-04 | End: 2023-06-04 | Stop reason: HOSPADM

## 2023-06-04 RX ORDER — ACETAMINOPHEN 325 MG/1
650 TABLET ORAL EVERY 6 HOURS
Status: DISCONTINUED | OUTPATIENT
Start: 2023-06-04 | End: 2023-06-04 | Stop reason: HOSPADM

## 2023-06-04 RX ORDER — ONDANSETRON 4 MG/1
4 TABLET, FILM COATED ORAL EVERY 6 HOURS PRN
Status: DISCONTINUED | OUTPATIENT
Start: 2023-06-04 | End: 2023-06-04 | Stop reason: SDUPTHER

## 2023-06-04 RX ORDER — ONDANSETRON 4 MG/1
4 TABLET, FILM COATED ORAL EVERY 8 HOURS PRN
Status: DISCONTINUED | OUTPATIENT
Start: 2023-06-04 | End: 2023-06-06 | Stop reason: HOSPADM

## 2023-06-04 RX ORDER — FAMOTIDINE 20 MG/1
20 TABLET, FILM COATED ORAL 2 TIMES DAILY PRN
Status: DISCONTINUED | OUTPATIENT
Start: 2023-06-04 | End: 2023-06-04 | Stop reason: HOSPADM

## 2023-06-04 RX ORDER — ONDANSETRON 2 MG/ML
4 INJECTION INTRAMUSCULAR; INTRAVENOUS EVERY 6 HOURS PRN
Status: DISCONTINUED | OUTPATIENT
Start: 2023-06-04 | End: 2023-06-04 | Stop reason: SDUPTHER

## 2023-06-04 RX ORDER — IBUPROFEN 600 MG/1
600 TABLET ORAL EVERY 6 HOURS PRN
Status: DISCONTINUED | OUTPATIENT
Start: 2023-06-04 | End: 2023-06-06 | Stop reason: HOSPADM

## 2023-06-04 RX ORDER — MAGNESIUM CARB/ALUMINUM HYDROX 105-160MG
30 TABLET,CHEWABLE ORAL ONCE
Status: DISCONTINUED | OUTPATIENT
Start: 2023-06-04 | End: 2023-06-04 | Stop reason: HOSPADM

## 2023-06-04 RX ORDER — ACETAMINOPHEN 325 MG/1
650 TABLET ORAL EVERY 4 HOURS PRN
Status: DISCONTINUED | OUTPATIENT
Start: 2023-06-04 | End: 2023-06-04 | Stop reason: SDUPTHER

## 2023-06-04 RX ORDER — FAMOTIDINE 20 MG/1
20 TABLET, FILM COATED ORAL ONCE AS NEEDED
Status: DISCONTINUED | OUTPATIENT
Start: 2023-06-04 | End: 2023-06-04 | Stop reason: SDUPTHER

## 2023-06-04 RX ORDER — FENTANYL CITRATE 50 UG/ML
INJECTION, SOLUTION INTRAMUSCULAR; INTRAVENOUS
Status: COMPLETED
Start: 2023-06-04 | End: 2023-06-04

## 2023-06-04 RX ORDER — PROMETHAZINE HYDROCHLORIDE 12.5 MG/1
12.5 TABLET ORAL EVERY 6 HOURS PRN
Status: DISCONTINUED | OUTPATIENT
Start: 2023-06-04 | End: 2023-06-04 | Stop reason: HOSPADM

## 2023-06-04 RX ORDER — FAMOTIDINE 10 MG/ML
20 INJECTION, SOLUTION INTRAVENOUS 2 TIMES DAILY PRN
Status: DISCONTINUED | OUTPATIENT
Start: 2023-06-04 | End: 2023-06-04 | Stop reason: HOSPADM

## 2023-06-04 RX ORDER — IBUPROFEN 600 MG/1
600 TABLET ORAL EVERY 6 HOURS
Status: DISCONTINUED | OUTPATIENT
Start: 2023-06-04 | End: 2023-06-04 | Stop reason: HOSPADM

## 2023-06-04 RX ORDER — METHYLERGONOVINE MALEATE 0.2 MG/ML
200 INJECTION INTRAVENOUS ONCE AS NEEDED
Status: DISCONTINUED | OUTPATIENT
Start: 2023-06-04 | End: 2023-06-04 | Stop reason: HOSPADM

## 2023-06-04 RX ORDER — TRISODIUM CITRATE DIHYDRATE AND CITRIC ACID MONOHYDRATE 500; 334 MG/5ML; MG/5ML
30 SOLUTION ORAL ONCE AS NEEDED
Status: DISCONTINUED | OUTPATIENT
Start: 2023-06-04 | End: 2023-06-04 | Stop reason: HOSPADM

## 2023-06-04 RX ORDER — MISOPROSTOL 200 UG/1
600 TABLET ORAL ONCE
Status: DISCONTINUED | OUTPATIENT
Start: 2023-06-04 | End: 2023-06-04 | Stop reason: HOSPADM

## 2023-06-04 RX ORDER — HYDROCODONE BITARTRATE AND ACETAMINOPHEN 10; 325 MG/1; MG/1
1 TABLET ORAL EVERY 4 HOURS PRN
Status: DISCONTINUED | OUTPATIENT
Start: 2023-06-04 | End: 2023-06-06 | Stop reason: HOSPADM

## 2023-06-04 RX ORDER — HYDROCODONE BITARTRATE AND ACETAMINOPHEN 5; 325 MG/1; MG/1
1 TABLET ORAL EVERY 4 HOURS PRN
Status: DISCONTINUED | OUTPATIENT
Start: 2023-06-04 | End: 2023-06-04 | Stop reason: HOSPADM

## 2023-06-04 RX ORDER — MORPHINE SULFATE 2 MG/ML
2 INJECTION, SOLUTION INTRAMUSCULAR; INTRAVENOUS
Status: DISCONTINUED | OUTPATIENT
Start: 2023-06-04 | End: 2023-06-04

## 2023-06-04 RX ORDER — MORPHINE SULFATE 5 MG/ML
5 INJECTION, SOLUTION INTRAMUSCULAR; INTRAVENOUS
Status: DISCONTINUED | OUTPATIENT
Start: 2023-06-04 | End: 2023-06-04 | Stop reason: HOSPADM

## 2023-06-04 RX ORDER — CALCIUM CARBONATE 500 MG/1
1 TABLET, CHEWABLE ORAL 3 TIMES DAILY PRN
Status: DISCONTINUED | OUTPATIENT
Start: 2023-06-04 | End: 2023-06-06 | Stop reason: HOSPADM

## 2023-06-04 RX ORDER — OXYTOCIN/0.9 % SODIUM CHLORIDE 30/500 ML
1 PLASTIC BAG, INJECTION (ML) INTRAVENOUS
Status: DISCONTINUED | OUTPATIENT
Start: 2023-06-04 | End: 2023-06-04

## 2023-06-04 RX ORDER — EPHEDRINE SULFATE 50 MG/ML
5 INJECTION, SOLUTION INTRAVENOUS
Status: DISCONTINUED | OUTPATIENT
Start: 2023-06-04 | End: 2023-06-04 | Stop reason: HOSPADM

## 2023-06-04 RX ADMIN — SODIUM CHLORIDE, POTASSIUM CHLORIDE, SODIUM LACTATE AND CALCIUM CHLORIDE 125 ML/HR: 600; 310; 30; 20 INJECTION, SOLUTION INTRAVENOUS at 03:52

## 2023-06-04 RX ADMIN — LIDOCAINE HYDROCHLORIDE AND EPINEPHRINE 3 ML: 15; 5 INJECTION, SOLUTION EPIDURAL at 08:56

## 2023-06-04 RX ADMIN — IBUPROFEN 600 MG: 600 TABLET, FILM COATED ORAL at 20:21

## 2023-06-04 RX ADMIN — LIDOCAINE HYDROCHLORIDE AND EPINEPHRINE 8 ML: 15; 5 INJECTION, SOLUTION EPIDURAL at 08:59

## 2023-06-04 RX ADMIN — Medication 10 ML/HR: at 09:00

## 2023-06-04 RX ADMIN — Medication 2 MILLI-UNITS/MIN: at 04:35

## 2023-06-04 RX ADMIN — FENTANYL CITRATE 100 MCG: 50 INJECTION, SOLUTION INTRAMUSCULAR; INTRAVENOUS at 08:59

## 2023-06-04 RX ADMIN — ONDANSETRON 4 MG: 2 INJECTION INTRAMUSCULAR; INTRAVENOUS at 15:03

## 2023-06-04 RX ADMIN — ACETAMINOPHEN 650 MG: 325 TABLET ORAL at 17:22

## 2023-06-04 NOTE — L&D DELIVERY NOTE
[facility]   Vaginal Delivery Note    Patient Name: Jami Gutierrez  : 1995  MRN: [id number]    Date of Delivery: 2023     Diagnosis     Pre & Post-Delivery:  Intrauterine pregnancy at 39w4d  Labor status: Spontaneous Onset of Labor     Normal labor             Problem List    Transfer to Postpartum     Review the Delivery Report for details.     Delivery     Delivery: Vaginal, Spontaneous     YOB: 2022    Time of Birth:  Gestational Age 1:38 PM   39w4d     Anesthesia: Epidural     Delivering clinician: Haily Mcneill    Forceps?   No   Vacuum? No    Shoulder dystocia present: No        Delivery narrative: Presented with spontaneous rupture membranes and early labor.  She received Pitocin augmentation.  She had epidural analgesia.  She progressed without complication delivering a liveborn male infant over a midline episiotomy.  Nuchal cord x1 reduced cord around the left wrist x1 also reduced.  The infant was bulb suctioned at the perineum.  He was then laid on the mother's abdomen where he was attended to by the nursery personnel.  After approximately 1 minute the umbilical cord was clamped in 2 places and cut in between.  It was cut by the father.  Samples of blood were collected from the umbilical cord for arterial and venous gas sampling and Leonardo testing.  There was spontaneous delivery intact placenta with three-vessel cord and trailing membranes.  The uterus was massaged and the cavity explored with a sponge on a stick.  The episiotomy was repaired with 3-0 Monocryl for good reapproximation and hemostasis.  Digital rectal exam was performed.  The uterus was again massaged and explored.  The placenta will be discarded.   cc.  Mom and infant are in the LDR in satisfactory condition.      Infant     Findings: male  infant     Infant observations: Weight: 2705 g (5 lb 15.4 oz)   Length: 18  in  Observations/Comments:        Apgars: 9  @ 1 minute /    9  @ 5  minutes   Infant Name: Toby     Placenta & Cord         Placenta delivered  Spontaneous  at   6/4/2023  1:40 PM     Cord: 3 vessels  present.   Nuchal Cord?  yes; Number of nuchal loops present:  1    Cord blood obtained: Yes    Cord gases obtained:  Yes    Cord gas results: Venous:    pH, Cord Venous   Date Value Ref Range Status   06/04/2023 7.198 (L) 7.310 - 7.370 pH Units Final       Arterial:    pH, Cord Arterial   Date Value Ref Range Status   06/04/2023 7.26 7.21 - 7.31 pH Units Final        Repair     Episiotomy: Median     Yes  Suture used for repair: 3-0 Vicryl   Lacerations: No   Estimated Blood Loss: Est. Blood Loss (mL): 256 mL (Filed from Delivery Summary per triton) (06/04/23 1338)     Quantitative Blood Loss:          Complications     none    Disposition     Mother to Remain in LD  in stable condition currently.  Baby to remains with mom  in stable condition currently.    Haily Mcneill MD  06/04/23  14:36 EDT

## 2023-06-04 NOTE — H&P
[facility]  Obstetric History and Physical    Chief Complaint   Patient presents with    Leaking Fluid       Subjective     Patient is a 27 y.o. female  currently at 39w4d, who presents with complaint of leakage of fluid.  She reports some contractions.  Positive fetal movements.  No fever or chills.    Her prenatal care is benign.  Her previous obstetric/gynecological history is noted for is non-contributory.    The following portions of the patients history were reviewed and updated as appropriate: current medications, allergies, past medical history, past surgical history, past family history, past social history, and problem list .       Prenatal Information:  Prenatal Results       Initial Prenatal Labs       Test Value Reference Range Date Time    Hemoglobin  11.2 g/dL 12.0 - 15.9 23 1145    Hematocrit  33.2 % 34.0 - 46.6 23 1145    Platelets  386 10*3/mm3 140 - 450 23 1145    Rubella IgG  6.80 index Immune >0.99 23 1145    Hepatitis B SAg  Non-Reactive  Non-Reactive 23 1145    Hepatitis C Ab  Non-Reactive  Non-Reactive 23 1145    RPR        T. Pallidum Ab   Non-Reactive  Non-Reactive 23 1145    ABO  O   23 0347    Rh  Positive   23 0347    Antibody Screen  Negative   23 1145    HIV  Non-Reactive  Non-Reactive 23 1145    Urine Culture  50,000 CFU/mL Mixed Kell Isolated   23 1648       >100,000 CFU/mL Escherichia coli   23 1641       25,000 CFU/mL Mixed Kell Isolated   23 1700       >100,000 CFU/mL Escherichia coli   23 1605       >100,000 CFU/mL Escherichia coli   23 1516       >100,000 CFU/mL Escherichia coli   23 1621       >100,000 CFU/mL Escherichia coli   22 1345    Gonorrhea  Negative  Negative 22 1345    Chlamydia  Negative  Negative 22 1345    TSH  1.650 uIU/mL 0.270 - 4.200 22 0911    HgB A1c         Varicella IgG        HgB Electrophoresis         Cystic fibrosis                    Fetal testing        Test Value Reference Range Date Time    NIPT        MSAFP        AFP-4                  2nd and 3rd Trimester       Test Value Reference Range Date Time    Hemoglobin (repeated)  10.1 g/dL 12.0 - 15.9 06/04/23 0347       11.7 g/dL 12.0 - 15.9 05/11/23 0908       11.0 g/dL 12.0 - 15.9 04/23/23 2146       10.8 g/dL 12.0 - 15.9 04/19/23 1354       10.8 g/dL 12.0 - 15.9 02/21/23 1607    Hematocrit (repeated)  31.4 % 34.0 - 46.6 06/04/23 0347       36.2 % 34.0 - 46.6 05/11/23 0908       33.7 % 34.0 - 46.6 04/23/23 2146       32.1 % 34.0 - 46.6 04/19/23 1354       33.3 % 34.0 - 46.6 02/21/23 1607    Platelets   366 10*3/mm3 140 - 450 06/04/23 0347       384 10*3/mm3 140 - 450 05/11/23 0908       382 10*3/mm3 140 - 450 04/23/23 2146       385 10*3/mm3 140 - 450 04/19/23 1354       396 10*3/mm3 140 - 450 02/21/23 1607       386 10*3/mm3 140 - 450 01/05/23 1145    GCT  108 mg/dL 65 - 139 02/21/23 1607    Antibody Screen (repeated)  Negative   06/04/23 0347       Negative   01/05/23 1145    GTT Fasting        GTT 1 Hr        GTT 2 Hr        GTT 3 Hr        Group B Strep  Negative  Negative 05/11/23 1458              Other testing        Test Value Reference Range Date Time    Parvo IgG         CMV IgG                   Drug Screening       Test Value Reference Range Date Time    Amphetamine Screen        Barbiturate Screen        Benzodiazepine Screen        Methadone Screen        Phencyclidine Screen        Opiates Screen        THC Screen        Cocaine Screen        Propoxyphene Screen        Buprenorphine Screen        Methamphetamine Screen        Oxycodone Screen        Tricyclic Antidepressants Screen                  Legend    ^: Historical                          External Prenatal Results       Pregnancy Outside Results - Transcribed From Office Records - See Scanned Records For Details       Test Value Date Time    ABO  O  06/04/23 0347    Rh  Positive  06/04/23 0347    Antibody  Screen  Negative  23 0347       Negative  23 1145    Varicella IgG       Rubella  6.80 index 23 1145    Hgb  10.1 g/dL 23 0347       11.7 g/dL 23 0908       11.0 g/dL 23 2146       10.8 g/dL 23 1354       10.8 g/dL 23 1607       11.2 g/dL 23 1145    Hct  31.4 % 23 0347       36.2 % 23 0908       33.7 % 23 2146       32.1 % 23 1354       33.3 % 23 1607       33.2 % 23 1145    Glucose Fasting GTT       Glucose Tolerance Test 1 hour       Glucose Tolerance Test 3 hour       Gonorrhea (discrete)  Negative  22 1345    Chlamydia (discrete)  Negative  22 1345    RPR       VDRL       Syphilis Antibody       HBsAg  Non-Reactive  23 1145    Herpes Simplex Virus PCR       Herpes Simplex VIrus Culture       HIV  Non-Reactive  23 1145    Hep C RNA Quant PCR       Hep C Antibody  Non-Reactive  23 1145    AFP       Group B Strep  Negative  23 1458    GBS Susceptibility to Clindamycin       GBS Susceptibility to Erythromycin       Fetal Fibronectin       Genetic Testing, Maternal Blood                 Drug Screening       Test Value Date Time    Urine Drug Screen       Amphetamine Screen       Barbiturate Screen       Benzodiazepine Screen       Methadone Screen       Phencyclidine Screen       Opiates Screen       THC Screen       Cocaine Screen       Propoxyphene Screen       Buprenorphine Screen       Methamphetamine Screen       Oxycodone Screen       Tricyclic Antidepressants Screen                 Legend    ^: Historical                             Past OB History:     OB History    Para Term  AB Living   2 0 0 0 1 0   SAB IAB Ectopic Molar Multiple Live Births   0 1 0 0 0 0      # Outcome Date GA Lbr Elian/2nd Weight Sex Delivery Anes PTL Lv   2 Current            1 IAB 2017               Past Medical History: Past Medical History:   Diagnosis Date    Abnormal uterine bleeding     Anxiety      Migraine     Ovarian cyst     Urinary tract infection       Past Surgical History History reviewed. No pertinent surgical history.   Family History: Family History   Problem Relation Age of Onset    Hypertension Father     Prostate cancer Father     Hypertension Mother     Diabetes Maternal Grandmother     Ovarian cancer Neg Hx     Uterine cancer Neg Hx     Breast cancer Neg Hx     Colon cancer Neg Hx     Melanoma Neg Hx     Clotting disorder Neg Hx       Social History:  reports that she has never smoked. She has been exposed to tobacco smoke. She has never used smokeless tobacco.   reports that she does not currently use alcohol.   reports no history of drug use.        General ROS: Pertinent items are noted in HPI    Objective       Vital Signs Range for the last 24 hours  Temperature: Temp:  [98.2 °F (36.8 °C)] 98.2 °F (36.8 °C)   Temp Source: Temp src: Oral   BP: BP: (111-135)/(75-84) 129/83   Pulse: Heart Rate:  [74-91] 79   Respirations: Resp:  [16] 16   SPO2: SpO2:  [98 %-100 %] 98 %   O2 Amount (l/min):     O2 Devices Device (Oxygen Therapy): room air   Weight: Weight:  [53.5 kg (118 lb)] 53.5 kg (118 lb)     Physical Examination: General appearance - alert, well appearing, and in no distress  Mental status - alert, oriented to person, place, and time  Chest - clear to auscultation, no wheezes, rales or rhonchi, symmetric air entry  Heart - normal rate, regular rhythm, normal S1, S2, no murmurs, rubs, clicks or gallops  Abdomen - soft, nontender, gravid    Extremities -trace edema    Presentation: Vertex   Cervix: Exam by: Nurse   Dilation: 3 to 4 cm   Effacement: 80% effaced   Station:         Fetal Heart Rate Assessment   Method:     Beats/min:     Baseline:     Variability:     Accels:     Decels:           Uterine Assessment   Method:     Frequency (min):     Ctx Count in 10 min:     Duration:     Intensity:         [place] Units:       GBS is negative      Assessment & Plan       Normal  labor        Assessment:  1.  Intrauterine pregnancy at 39w4d gestation with reactive fetal status.    2.  labor  with ROM  3.  Obstetrical history significant for is non-contributory.  4.  GBS status:   Group B Strep, DNA   Date Value Ref Range Status   05/11/2023 Negative Negative Final       Plan:  1. Vaginal anticipated  2. Plan of care has been reviewed with patient and patient agrees.   3.  Risks, benefits of treatment plan have been discussed.  4.  All questions have been answered.        All Alcaraz MD  6/4/2023  05:49 EDT

## 2023-06-04 NOTE — PROGRESS NOTES
"[facility]  Obstetric Progress Note    Subjective     Patient:    The patient feels well.      Objective     Vital Signs Range for the last 24 hours          BP: (135)/(79) 135/79   Heart Rate:  [91] 91   Resp:  [16] 16   SpO2:  [99 %] 99 %       Device (Oxygen Therapy): room air   Weight:  [53.5 kg (118 lb)] 53.5 kg (118 lb)       Flowsheet Rows      Flowsheet Row First Filed Value   Admission Height 154.9 cm (61\") Documented at 06/04/2023 0253   Admission Weight 53.5 kg (118 lb) Documented at 06/04/2023 0253            Intake/Output last 24 hours:    No intake or output data in the 24 hours ending 06/04/23 0325    Intake/Output this shift:    No intake/output data recorded.    Physical Exam:  General: Patient is in no acute distress   Heart CVS exam: normal rate, regular rhythm, normal S1, S2, no murmurs, rubs, clicks or gallops.   Lungs Chest: clear to auscultation, no wheezes, rales or rhonchi, symmetric air entry.     Abdomen Abdominal exam: soft, nontender, nondistended, no masses or organomegaly.   Extremities Exam of extremities: peripheral pulses normal, no pedal edema, no clubbing or cyanosis     Presentation: vertex   Cervix: Exam by:     Dilation: 3-4 cm    Effacement:     Station:           Fetal Heart Rate Assessment   Method: External    Beats/min: 130    Baseline: 130    Variability: moderate    Accels: present    Decels:     Tracing Category: I      Uterine Assessment   Method: external    Frequency (min): irregular    Ctx Count in 10 min:     Duration:     Intensity:     Intensity by IU[facility]:     Resting Tone:     Resting Tone by IU[facility]:     [place] Units:         Assessment & Plan       Normal labor        Assessment:  1.  Intrauterine pregnancy at 39w4d gestation with reactive fetal status.    2.  labor  with ROM  3.  Obstetrical history significant for is non-contributory.  4.  GBS status:   Group B Strep, DNA   Date Value Ref Range Status   05/11/2023 Negative Negative Final       Plan:  1. " Vaginal anticipated  2. Plan of care has been reviewed with patient and support person at bedside  3.  Risks, benefits of treatment plan have been discussed.  4.  All questions have been answered.  5.  Augment with Pitocin, monitor progress      Haily Mcneill MD  6/4/2023  03:25 EDT

## 2023-06-04 NOTE — PLAN OF CARE
Problem: Adult Inpatient Plan of Care  Goal: Plan of Care Review  Outcome: Ongoing, Progressing  Goal: Patient-Specific Goal (Individualized)  Outcome: Ongoing, Progressing  Goal: Absence of Hospital-Acquired Illness or Injury  Outcome: Ongoing, Progressing  Goal: Optimal Comfort and Wellbeing  Outcome: Ongoing, Progressing  Goal: Readiness for Transition of Care  Outcome: Ongoing, Progressing  Intervention: Mutually Develop Transition Plan  Recent Flowsheet Documentation  Taken 6/4/2023 0529 by Jeanie Carrillo RN  Equipment Currently Used at Home: none  Transportation Anticipated: car, drives self  Patient/Family Anticipated Services at Transition: none  Patient/Family Anticipates Transition to: home     Problem: Bleeding (Labor)  Goal: Hemostasis  Outcome: Ongoing, Progressing     Problem: Change in Fetal Wellbeing (Labor)  Goal: Stable Fetal Wellbeing  Outcome: Ongoing, Progressing     Problem: Delayed Labor Progression (Labor)  Goal: Effective Progression to Delivery  Outcome: Ongoing, Progressing     Problem: Infection (Labor)  Goal: Absence of Infection Signs and Symptoms  Outcome: Ongoing, Progressing     Problem: Labor Pain (Labor)  Goal: Acceptable Pain Control  Outcome: Ongoing, Progressing     Problem: Uterine Tachysystole (Labor)  Goal: Normal Uterine Contraction Pattern  Outcome: Ongoing, Progressing   Goal Outcome Evaluation:   Patient comfortable at this time. Augmentation of labor d/t SROM. FHB reactive, regular contractions.

## 2023-06-04 NOTE — NURSING NOTE
MD notified of patient arrival.  at 39 4/7 weeks gestation presents to Labor and Delivery with Suspected SROM; Leaking Fluid. Fetal Tracing, Reactive; FHR Baseline, 125 bpm. Irregular contractions noted to tracing; Mild to palpation. Amnisure collected; Sent to Lab; Positive result. SVE, 3/-2, Posterior. GBS Status, Neg. VS, WNL; Reviewed. MD to place Admission Orders.   Bactrim Counseling:  I discussed with the patient the risks of sulfa antibiotics including but not limited to GI upset, allergic reaction, drug rash, diarrhea, dizziness, photosensitivity, and yeast infections.  Rarely, more serious reactions can occur including but not limited to aplastic anemia, agranulocytosis, methemoglobinemia, blood dyscrasias, liver or kidney failure, lung infiltrates or desquamative/blistering drug rashes.

## 2023-06-04 NOTE — ANESTHESIA PROCEDURE NOTES
Labor Epidural      Patient reassessed immediately prior to procedure    Patient location during procedure: OB  Start Time: 6/4/2023 8:50 AM  Stop Time: 6/4/2023 8:56 AM  Performed By  Anesthesiologist: Alfredo Henley MD  Preanesthetic Checklist  Completed: patient identified, IV checked, site marked, risks and benefits discussed, surgical consent, monitors and equipment checked, pre-op evaluation and timeout performed  Prep:  Pt Position:sitting  Sterile Tech:cap, gloves, gown, mask and sterile barrier  Prep:povidone-iodine 7.5% surgical scrub  Monitoring:blood pressure monitoring and continuous pulse oximetry  Epidural Block Procedure:  Approach:midline  Guidance:landmark technique  Location:L3-L4  Needle Type:Tuohy  Needle Gauge:17 G  Loss of Resistance Medium: air  Paresthesia: none  Aspiration:negative  Test Dose:negative  Medication: lidocaine 1.5%-EPINEPHrine 1:200,000 (XYLOCAINE W/EPI) injection - Epidural   3 mL - 6/4/2023 8:56:00 AM  fentaNYL citrate (PF) (SUBLIMAZE) injection - Epidural   100 mcg - 6/4/2023 8:59:00 AM  Number of Attempts: 1  Post Assessment:  Dressing:occlusive dressing applied and secured with tape  Pt Tolerance:patient tolerated the procedure well with no apparent complications  Complications:no

## 2023-06-04 NOTE — PLAN OF CARE
Problem: Bleeding (Labor)  Goal: Hemostasis  Outcome: Met     Problem: Change in Fetal Wellbeing (Labor)  Goal: Stable Fetal Wellbeing  Outcome: Met     Problem: Delayed Labor Progression (Labor)  Goal: Effective Progression to Delivery  Outcome: Met     Problem: Infection (Labor)  Goal: Absence of Infection Signs and Symptoms  Outcome: Met     Problem: Labor Pain (Labor)  Goal: Acceptable Pain Control  Outcome: Met  Intervention: Support Labor Pain Coping and Management  Description: Mutually set pain management plan and goals; review and adjust regularly.  Use a consistent, validated tool for pain assessment; evaluate pain level, effect of treatment and patient’s response at regular intervals; along with standard pain scales, the patient can also be asked how she is coping with the pain.  Consider the presence and impact of preexisting chronic pain.  Consider and address emotional response to pain.  Encourage patient and caregiver involvement in pain assessment, interventions and safety measures.  Match pharmacologic analgesia to severity and type of pain mechanism; evaluate risk for opioid use and dependence; consider multimodal approach and titrate to patient response.  Prepare for initiation of analgesia/anesthetic, such as neuraxial or inhaled; patient-controlled neuraxial analgesia may be used.  Monitor and address medication-induced effects, such as nausea, vomiting, urinary retention, somnolence, dizziness, maternal respiratory depression and  depression.  Initiate individualized nonpharmacologic pain management measures, including massage and effleurage, hot or cold application, acupressure, visual imagery, breathing techniques, birthing ball, shower or hot tub.  Encourage frequent position changes; use pillows to support position.  Maintain calm environment, including low lighting, comfortable temperature and decreased stimulation.  Recent Flowsheet Documentation  Taken 2023 0830 by  Gaby Gonzalez, RN  Pain Management Interventions: (awaiting epidural pitocin decreased) relaxation techniques promoted  Taken 6/4/2023 0815 by Gaby Gonzalez, RN  Pain Management Interventions: (called for epidural)   relaxation techniques promoted   aromatherapy utilized     Problem: Uterine Tachysystole (Labor)  Goal: Normal Uterine Contraction Pattern  Outcome: Met   Goal Outcome Evaluation:

## 2023-06-04 NOTE — ANESTHESIA PREPROCEDURE EVALUATION
Anesthesia Evaluation     Patient summary reviewed and Nursing notes reviewed                Airway   Mallampati: I  TM distance: >3 FB  Neck ROM: full  No difficulty expected  Dental      Pulmonary - negative pulmonary ROS and normal exam    breath sounds clear to auscultation  Cardiovascular - negative cardio ROS and normal exam    Rhythm: regular  Rate: normal        Neuro/Psych- negative ROS  GI/Hepatic/Renal/Endo - negative ROS     Musculoskeletal (-) negative ROS    Abdominal    Substance History - negative use     OB/GYN    (+) Pregnant        Other                      Anesthesia Plan    ASA 2     epidural       Anesthetic plan, risks, benefits, and alternatives have been provided, discussed and informed consent has been obtained with: patient.    CODE STATUS:

## 2023-06-05 PROCEDURE — 51702 INSERT TEMP BLADDER CATH: CPT

## 2023-06-05 RX ADMIN — IBUPROFEN 600 MG: 600 TABLET, FILM COATED ORAL at 17:52

## 2023-06-05 RX ADMIN — ACETAMINOPHEN 650 MG: 325 TABLET ORAL at 17:52

## 2023-06-05 RX ADMIN — DOCUSATE SODIUM 100 MG: 100 CAPSULE, LIQUID FILLED ORAL at 08:18

## 2023-06-05 RX ADMIN — FERROUS SULFATE TAB 325 MG (65 MG ELEMENTAL FE) 325 MG: 325 (65 FE) TAB at 07:57

## 2023-06-05 RX ADMIN — IBUPROFEN 600 MG: 600 TABLET, FILM COATED ORAL at 06:21

## 2023-06-05 NOTE — ANESTHESIA POSTPROCEDURE EVALUATION
Patient: Jami Gutierrez    Procedure Summary       Date: 06/04/23 Room / Location:     Anesthesia Start: 0850 Anesthesia Stop: 1338    Procedure: LABOR ANALGESIA Diagnosis:     Scheduled Providers:  Provider: Alfredo Henley MD    Anesthesia Type: epidural ASA Status: 2            Anesthesia Type: epidural    Vitals  Vitals Value Taken Time   /71 06/05/23 0621   Temp 36.7 °C (98.1 °F) 06/05/23 0621   Pulse 75 06/05/23 0621   Resp 16 06/05/23 0621   SpO2 99 % 06/04/23 1332   Vitals shown include unvalidated device data.        Post Anesthesia Care and Evaluation    Patient location during evaluation: bedside  Patient participation: complete - patient participated  Level of consciousness: awake  Pain management: adequate    Airway patency: patent  PONV Status: none  Cardiovascular status: acceptable and stable  Respiratory status: acceptable  Hydration status: acceptable  Post Neuraxial Block status: Motor and sensory function returned to baseline and No signs or symptoms of PDPH  Comments: An Anesthesiologist personally participated in the most demanding procedures (including induction and emergence if applicable) in the anesthesia plan, monitored the course of anesthesia administration at frequent intervals and remained physically present and available for immediate diagnosis and treatment of emergencies.

## 2023-06-05 NOTE — PLAN OF CARE
Problem: Adult Inpatient Plan of Care  Goal: Plan of Care Review  Outcome: Ongoing, Progressing  Flowsheets (Taken 6/5/2023 1632)  Progress: improving  Plan of Care Reviewed With: patient  Goal: Patient-Specific Goal (Individualized)  Outcome: Ongoing, Progressing  Goal: Absence of Hospital-Acquired Illness or Injury  Outcome: Ongoing, Progressing  Intervention: Identify and Manage Fall Risk  Recent Flowsheet Documentation  Taken 6/5/2023 1400 by Dorina Ely RN  Safety Promotion/Fall Prevention: safety round/check completed  Taken 6/5/2023 1305 by Dorina Ely RN  Safety Promotion/Fall Prevention: safety round/check completed  Taken 6/5/2023 0935 by Dorina Ely RN  Safety Promotion/Fall Prevention: safety round/check completed  Taken 6/5/2023 0818 by Dorina Ely RN  Safety Promotion/Fall Prevention: safety round/check completed  Intervention: Prevent Skin Injury  Recent Flowsheet Documentation  Taken 6/5/2023 0818 by Dorina Ely RN  Body Position: position changed independently  Intervention: Prevent and Manage VTE (Venous Thromboembolism) Risk  Recent Flowsheet Documentation  Taken 6/5/2023 0818 by Dorina Ely RN  Activity Management: up ad aura  Range of Motion: active ROM (range of motion) encouraged  Intervention: Prevent Infection  Recent Flowsheet Documentation  Taken 6/5/2023 0818 by Dorina Ely RN  Infection Prevention:   hand hygiene promoted   rest/sleep promoted   visitors restricted/screened   cohorting utilized   personal protective equipment utilized  Goal: Optimal Comfort and Wellbeing  Outcome: Ongoing, Progressing  Intervention: Monitor Pain and Promote Comfort  Recent Flowsheet Documentation  Taken 6/5/2023 1400 by Dorina Ely RN  Pain Management Interventions: medication offered but refused  Taken 6/5/2023 0818 by Dorina Ely RN  Pain Management Interventions:   pain management plan reviewed with patient/caregiver   no interventions per  patient request   quiet environment facilitated  Intervention: Provide Person-Centered Care  Recent Flowsheet Documentation  Taken 6/5/2023 0818 by Dorina Ely RN  Trust Relationship/Rapport:   questions encouraged   questions answered  Goal: Readiness for Transition of Care  Outcome: Ongoing, Progressing     Problem: Adjustment to Role Transition (Postpartum Vaginal Delivery)  Goal: Successful Maternal Role Transition  Outcome: Ongoing, Progressing     Problem: Bleeding (Postpartum Vaginal Delivery)  Goal: Hemostasis  Outcome: Ongoing, Progressing     Problem: Infection (Postpartum Vaginal Delivery)  Goal: Absence of Infection Signs/Symptoms  Outcome: Ongoing, Progressing     Problem: Pain (Postpartum Vaginal Delivery)  Goal: Acceptable Pain Control  Outcome: Ongoing, Progressing  Intervention: Prevent or Manage Pain  Recent Flowsheet Documentation  Taken 6/5/2023 1400 by Dorina Ely RN  Pain Management Interventions: medication offered but refused  Taken 6/5/2023 0818 by Dorina Ely RN  Pain Management Interventions:   pain management plan reviewed with patient/caregiver   no interventions per patient request   quiet environment facilitated     Problem: Urinary Retention (Postpartum Vaginal Delivery)  Goal: Effective Urinary Elimination  Outcome: Ongoing, Progressing  Intervention: Promote Effective Urinary Elimination  Recent Flowsheet Documentation  Taken 6/5/2023 0818 by Dorina Ely RN  Urinary Elimination Promotion: frequent voiding encouraged   Goal Outcome Evaluation:    Plan of Care Reviewed With: patient        Progress: improving

## 2023-06-05 NOTE — PROGRESS NOTES
" [facility]   Vaginal Delivery Progress Note    Subjective   Postpartum Day 1: Vaginal Delivery    The patient feels well.  Her pain is well controlled with nonsteroidal anti-inflammatory drugs.   She is ambulating well.  Patient describes her bleeding as moderate lochia.    Breastfeeding: declines.    Objective     Vital Signs Range for the last 24 hours  Temperature: Temp:  [98 °F (36.7 °C)-98.2 °F (36.8 °C)] 98.1 °F (36.7 °C)   Temp Source: Temp src: Oral   BP: BP: (109-138)/(60-87) 114/71   Pulse: Heart Rate:  [] 75   Respirations: Resp:  [16-20] 16   SPO2:     O2 Amount (l/min):     O2 Devices Device (Oxygen Therapy): room air   Weight:       Admit Height:  Height: 154.9 cm (61\")      Physical Exam:  General:  no acute distress.  Abdomen: Fundus: appropriate, firm, non tender  Extremities: normal, atraumatic, no cyanosis, and trace edema.       Lab results reviewed:  Yes   Rubella:  No results found for: RUBELLAIGGIN Nurse Transcribed from prenatal record --    Rubella Antibodies, IgG   Date Value Ref Range Status   01/05/2023 6.80 Immune >0.99 index Final     Comment:                                     Non-immune       <0.90                                  Equivocal  0.90 - 0.99                                  Immune           >0.99     Rh Status:    RH type   Date Value Ref Range Status   06/04/2023 Positive  Final     Immunizations:   Immunization History   Administered Date(s) Administered    DTP / HiB 1996, 1996, 1996    DTaP, Unspecified 09/22/1997    Hep B, Adolescent or Pediatric 1996, 06/23/1997    HiB 09/22/1997    MMR 09/22/1997    OPV 1996, 1996    Varicella 1996, 06/23/1997       Assessment & Plan       Normal labor    Postpartum care and examination immediately after delivery    Iron deficiency anemia during pregnancy    Encounter for induction of labor      Vaginal, Spontaneous         .      Plan:  Continue current care.      Haily Mcneill, " MD  2023  09:57 ALEXANDRAT MIN

## 2023-06-06 VITALS
SYSTOLIC BLOOD PRESSURE: 128 MMHG | OXYGEN SATURATION: 98 % | TEMPERATURE: 97.5 F | HEIGHT: 61 IN | RESPIRATION RATE: 18 BRPM | BODY MASS INDEX: 22.28 KG/M2 | WEIGHT: 118 LBS | DIASTOLIC BLOOD PRESSURE: 85 MMHG | HEART RATE: 73 BPM

## 2023-06-06 RX ORDER — IBUPROFEN 600 MG/1
600 TABLET ORAL EVERY 6 HOURS PRN
Qty: 30 TABLET | Refills: 0 | Status: SHIPPED | OUTPATIENT
Start: 2023-06-06

## 2023-06-06 RX ADMIN — ACETAMINOPHEN 650 MG: 325 TABLET ORAL at 00:18

## 2023-06-06 RX ADMIN — IBUPROFEN 600 MG: 600 TABLET, FILM COATED ORAL at 13:01

## 2023-06-06 RX ADMIN — DOCUSATE SODIUM 100 MG: 100 CAPSULE, LIQUID FILLED ORAL at 08:58

## 2023-06-06 RX ADMIN — ACETAMINOPHEN 650 MG: 325 TABLET ORAL at 13:01

## 2023-06-06 RX ADMIN — FERROUS SULFATE TAB 325 MG (65 MG ELEMENTAL FE) 325 MG: 325 (65 FE) TAB at 08:58

## 2023-06-06 RX ADMIN — IBUPROFEN 600 MG: 600 TABLET, FILM COATED ORAL at 00:18

## 2023-06-06 RX ADMIN — ACETAMINOPHEN 650 MG: 325 TABLET ORAL at 06:44

## 2023-06-06 RX ADMIN — IBUPROFEN 600 MG: 600 TABLET, FILM COATED ORAL at 06:44

## 2023-06-06 NOTE — DISCHARGE SUMMARY
TRACY Henderson  Delivery Discharge Summary    Primary OB Clinician:     EDC: Estimated Date of Delivery: 23    Admitting Diagnosis:  Normal labor [O80, Z37.9]  Normal labor [O80, Z37.9]  Encounter for induction of labor [Z34.90]    Discharge Diagnosis:  Same as Admitting plus:   Pregnancy at 39w4d - Delivered     Antepartum complications: anemia    Date of Delivery: 2023   Time of Delivery: 1:38 PM     Delivered By:  Haily Mcneill     Delivery Type: Vaginal, Spontaneous      Tubal Ligation: n/a    Baby:male  infant;   Apgar:  9  @ 1 minute /   Apgar:  9  @ 5 minutes   Weight: 2705 g (5 lb 15.4 oz)    Length: 18     Anesthesia: Epidural      Intrapartum complications: None    Laceration: No    Episiotomy: Yes [unfilled]    Placenta: Spontaneous     Feeding method: Breastfeeding Status: No    Rh Immune globulin given: not applicable    Rubella vaccine given: not applicable    Hospital course: Admitted after PROM at term.  Progressed with Pitocin augmentation.  Uncomplicated .  She is done well postpartum.  She has met the expected milestones.  She is tolerating p.o. foods fluids and reporting adequate pain management, as well as bowel and bladder function.  She has chosen to bottlefeed.  Post partum instructions provided.  She will maintain pelvic rest.  She is considering the Nexplanon for contraception..    Discharge Date: 2023; Discharge Time: 09:25 EDT        Plan:      Follow-up appointment with Dr. Mcneill in 2 weeks.     Electronically signed by Haily Mcneill MD, 23, 9:25 AM EDT.

## 2023-06-06 NOTE — PLAN OF CARE
Problem: Adult Inpatient Plan of Care  Goal: Plan of Care Review  Outcome: Met  Goal: Patient-Specific Goal (Individualized)  Outcome: Met  Goal: Absence of Hospital-Acquired Illness or Injury  Outcome: Met  Intervention: Identify and Manage Fall Risk  Intervention: Prevent and Manage VTE (Venous Thromboembolism) Risk  Intervention: Prevent Infection  Goal: Optimal Comfort and Wellbeing  Outcome: Met  Goal: Readiness for Transition of Care  Outcome: Met  Intervention: Mutually Develop Transition Plan     Problem: Adjustment to Role Transition (Postpartum Vaginal Delivery)  Goal: Successful Maternal Role Transition  Outcome: Met     Problem: Bleeding (Postpartum Vaginal Delivery)  Goal: Hemostasis  Outcome: Met     Problem: Infection (Postpartum Vaginal Delivery)  Goal: Absence of Infection Signs/Symptoms  Outcome: Met  Intervention: Prevent or Manage Infection     Problem: Pain (Postpartum Vaginal Delivery)  Goal: Acceptable Pain Control  Outcome: Met     Problem: Urinary Retention (Postpartum Vaginal Delivery)  Goal: Effective Urinary Elimination  Outcome: Met   Goal Outcome Evaluation:      Pt is up ad aura doing her own and her infant's care. Vss, pain and lochia are wnl. Appropriate bonding noted. Ready for discharge home with infant.

## 2023-06-06 NOTE — PLAN OF CARE
Problem: Adult Inpatient Plan of Care  Goal: Plan of Care Review  Outcome: Ongoing, Progressing  Goal: Patient-Specific Goal (Individualized)  Outcome: Ongoing, Progressing  Goal: Absence of Hospital-Acquired Illness or Injury  Outcome: Ongoing, Progressing  Intervention: Identify and Manage Fall Risk  Recent Flowsheet Documentation  Taken 6/6/2023 0638 by Apryl Sheriff RNA  Safety Promotion/Fall Prevention: safety round/check completed  Taken 6/5/2023 1925 by Apryl Sheriff RNA  Safety Promotion/Fall Prevention: safety round/check completed  Goal: Optimal Comfort and Wellbeing  Outcome: Ongoing, Progressing  Intervention: Monitor Pain and Promote Comfort  Recent Flowsheet Documentation  Taken 6/6/2023 0638 by Apryl Sheriff RNA  Pain Management Interventions: (sitz bath offered) see MAR  Goal: Readiness for Transition of Care  Outcome: Ongoing, Progressing     Problem: Adjustment to Role Transition (Postpartum Vaginal Delivery)  Goal: Successful Maternal Role Transition  Outcome: Ongoing, Progressing     Problem: Bleeding (Postpartum Vaginal Delivery)  Goal: Hemostasis  Outcome: Ongoing, Progressing     Problem: Infection (Postpartum Vaginal Delivery)  Goal: Absence of Infection Signs/Symptoms  Outcome: Ongoing, Progressing     Problem: Pain (Postpartum Vaginal Delivery)  Goal: Acceptable Pain Control  Outcome: Ongoing, Progressing  Intervention: Prevent or Manage Pain  Recent Flowsheet Documentation  Taken 6/6/2023 0638 by Apryl Sheriff, RNA  Pain Management Interventions: (sitz bath offered) see MAR     Problem: Urinary Retention (Postpartum Vaginal Delivery)  Goal: Effective Urinary Elimination  Outcome: Ongoing, Progressing   Goal Outcome Evaluation:

## 2023-06-06 NOTE — PROGRESS NOTES
"TRACY Henderson   Vaginal Delivery Progress Note    Subjective   Postpartum Day 2: Vaginal Delivery    The patient feels well.  Her pain is well controlled with nonsteroidal anti-inflammatory drugs.   She is ambulating well.  Patient describes her bleeding as moderate lochia.    Breastfeeding: declines.    Objective     Vital Signs Range for the last 24 hours  Temperature: Temp:  [97.9 °F (36.6 °C)-98.6 °F (37 °C)] 97.9 °F (36.6 °C)   Temp Source: Temp src: Oral   BP: BP: (118-131)/(67-73) 118/73   Pulse: Heart Rate:  [66-88] 66   Respirations: Resp:  [16-18] 18   SPO2:     O2 Amount (l/min):     O2 Devices     Weight:       Admit Height:  Height: 154.9 cm (61\")      Physical Exam:  General:  no acute distress.  Abdomen: Fundus: appropriate, firm, non tender  Extremities: normal, atraumatic, no cyanosis, and trace edema.       Lab results reviewed:  no new labs  Rubella:  No results found for: RUBELLAIGGIN Nurse Transcribed from prenatal record --    Rubella Antibodies, IgG   Date Value Ref Range Status   01/05/2023 6.80 Immune >0.99 index Final     Comment:                                     Non-immune       <0.90                                  Equivocal  0.90 - 0.99                                  Immune           >0.99     Rh Status:    RH type   Date Value Ref Range Status   06/04/2023 Positive  Final     Immunizations:   Immunization History   Administered Date(s) Administered    DTP / HiB 1996, 1996, 1996    DTaP, Unspecified 09/22/1997    Hep B, Adolescent or Pediatric 1996, 06/23/1997    HiB 09/22/1997    MMR 09/22/1997    OPV 1996, 1996    Varicella 1996, 06/23/1997       Assessment & Plan       Normal labor    Postpartum care and examination immediately after delivery    Iron deficiency anemia during pregnancy    Encounter for induction of labor      Vaginal, Spontaneous         .      Plan:  Discharge home with standard precautions and return to clinic in 4-6 " weeks.      Haily Mcneill MD  6/6/2023  09:23 EDT

## 2023-06-13 ENCOUNTER — TELEPHONE (OUTPATIENT)
Dept: OBSTETRICS AND GYNECOLOGY | Facility: CLINIC | Age: 28
End: 2023-06-13
Payer: COMMERCIAL

## 2023-08-21 NOTE — PROGRESS NOTES
"Colposcopy    CC:  Pt presents for colposcopy  Chief Complaint   Patient presents with    Colposcopy       Pregnant: No  Birth control: Birth control pills  Tobacco/Nicotine use:  No  Pap result: LGSIL  cg:  Negative  Consent signed: Yes    Procedure reviewed in detail.  She understands the potential risks include, but are not limited to, pain, bleeding, and infection.  Her questions have been answered.    Subjective/HPI:Patient is a 28 y.o. female with abnormal pap smear results       Objective:  /74   Pulse 76   Ht 154.9 cm (61\")   Wt 45.1 kg (99 lb 6.4 oz)   Breastfeeding No   BMI 18.78 kg/mý   External genitalia- Without lesion Vulvar Colposcopy : No lesions  Perineum- Without lesion  Vagina- Without lesion Vaginal Colposcopy : No lesions  Cvx- Biopsies taken at lesion site/s 12 o'clock  Patient tolerated the procedure well.     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  HPV incidence, transmission, course, remission, progression, types, cervical cancer, genital warts, monitoring, and importance of compliance were reviewed.  , HPV vaccine was discussed and recommended.  Written information was made available.  The vaccine protects against the 2 types of HPV that cause 70% of cervical cancer and 90% of genital warts.  Condoms were recommended    She understands the need for continued follow up until she is cleared to return to routine screening pap smears.  She understands the importance of follow up and consequences with lack of follow up (i.e. potential for cervical cancer).  Follow up usually ranges every 6months - 12months. "      PRECAUTIONS - It is common to have bright red spotting and dark discharge after today.  If she has had cervical biopsies, she is to avoid intercourse or tampons as directed for 7 days.  She can use OTC pain relievers prn.  She needs to return to office or ER (if after hours/weekends) if she has pelvic pain, bleeding > 1 pad/2 hours, discharge that has a bad vaginal odor or vaginal itching, fever > 101.5, or any other concerns.      I spent 20 minutes on the separately reported service of Colposcopy. This time is not included in the time used to support the E/M service also reported today.    Follow Up:  Return in about 1 year (around 8/22/2024) for Annual exam.      Yohana Paul DO  08/22/2023    [facility]  [address] [place] [address]  Dept: [phone]  Dept Fax: [phone]  Loc: [phone]  Loc Fax: [phone]

## 2023-08-22 ENCOUNTER — PROCEDURE VISIT (OUTPATIENT)
Dept: OBSTETRICS AND GYNECOLOGY | Facility: CLINIC | Age: 28
End: 2023-08-22
Payer: COMMERCIAL

## 2023-08-22 VITALS
BODY MASS INDEX: 18.77 KG/M2 | WEIGHT: 99.4 LBS | HEART RATE: 76 BPM | SYSTOLIC BLOOD PRESSURE: 104 MMHG | HEIGHT: 61 IN | DIASTOLIC BLOOD PRESSURE: 74 MMHG

## 2023-08-22 DIAGNOSIS — R87.612 LGSIL ON PAP SMEAR OF CERVIX: Primary | ICD-10-CM

## 2023-08-22 LAB
B-HCG UR QL: NEGATIVE
EXPIRATION DATE: NORMAL
INTERNAL NEGATIVE CONTROL: NORMAL
INTERNAL POSITIVE CONTROL: NORMAL
Lab: NORMAL

## 2023-08-22 PROCEDURE — 88305 TISSUE EXAM BY PATHOLOGIST: CPT | Performed by: OBSTETRICS & GYNECOLOGY

## 2023-08-25 LAB
CYTO UR: NORMAL
LAB AP CASE REPORT: NORMAL
LAB AP CLINICAL INFORMATION: NORMAL
PATH REPORT.FINAL DX SPEC: NORMAL
PATH REPORT.GROSS SPEC: NORMAL

## 2023-09-01 ENCOUNTER — TELEPHONE (OUTPATIENT)
Dept: OBSTETRICS AND GYNECOLOGY | Facility: CLINIC | Age: 28
End: 2023-09-01
Payer: COMMERCIAL

## 2023-09-01 DIAGNOSIS — Z30.41 ENCOUNTER FOR SURVEILLANCE OF CONTRACEPTIVE PILLS: Primary | ICD-10-CM

## 2023-09-01 NOTE — TELEPHONE ENCOUNTER
Caller: JEIMY LIU    Relationship: SELF    Best call back number: [phone]    Requested Prescriptions:     BIRTH CONTROL PILL     Pharmacy where request should be sent:    [facility]- [phone]    Last office visit with prescribing clinician: 08/22/2023  Next office visit with prescribing clinician: 9/6/2023     Additional details provided by patient:       B/C PATCH IS MORE EXPENSIVE- PT WANTING TO SWITCH TO ANY BC PILL        Would you like a call back once the refill request has been completed: [x] Yes [] No        Mecca Fernandez Rep   09/01/23 09:16 EDT

## 2023-09-01 NOTE — TELEPHONE ENCOUNTER
Please read the following message from the HUB and advise.  She never picked up her Rx and just used the samples you had given her.  She said it is over $100 for a one month supply and that it not feasible for her.  I let the patient know that this message will not be answered until around Tuesday when you are back in the office.  She asked for a sample of Twirla so that she can continue on the birth control until you decide what else might work for her.  She will  one month supply sample from the  today.  Does she need to come in for a consult?

## 2023-09-05 NOTE — TELEPHONE ENCOUNTER
I have sent an Rx for Xulane to the patient's pharmacy and have left a message regarding this on the patient's voicemail.

## 2023-09-06 ENCOUNTER — OFFICE VISIT (OUTPATIENT)
Dept: OBSTETRICS AND GYNECOLOGY | Facility: CLINIC | Age: 28
End: 2023-09-06
Payer: COMMERCIAL

## 2023-09-06 ENCOUNTER — PREP FOR SURGERY (OUTPATIENT)
Dept: OTHER | Facility: HOSPITAL | Age: 28
End: 2023-09-06
Payer: COMMERCIAL

## 2023-09-06 VITALS
HEIGHT: 61 IN | DIASTOLIC BLOOD PRESSURE: 81 MMHG | SYSTOLIC BLOOD PRESSURE: 119 MMHG | WEIGHT: 99.8 LBS | HEART RATE: 74 BPM | BODY MASS INDEX: 18.84 KG/M2

## 2023-09-06 DIAGNOSIS — N87.1 DYSPLASIA OF CERVIX, HIGH GRADE CIN 2: Primary | ICD-10-CM

## 2023-09-06 RX ORDER — SODIUM CHLORIDE 0.9 % (FLUSH) 0.9 %
3 SYRINGE (ML) INJECTION EVERY 12 HOURS SCHEDULED
OUTPATIENT
Start: 2023-09-06

## 2023-09-06 RX ORDER — SODIUM CHLORIDE 9 MG/ML
40 INJECTION, SOLUTION INTRAVENOUS AS NEEDED
OUTPATIENT
Start: 2023-09-06

## 2023-09-06 RX ORDER — SODIUM CHLORIDE 0.9 % (FLUSH) 0.9 %
10 SYRINGE (ML) INJECTION AS NEEDED
OUTPATIENT
Start: 2023-09-06

## 2023-09-06 NOTE — PROGRESS NOTES
"GYN Visit    Chief Complaint   Patient presents with    Follow-up     FU biopsy results        HPI:   28 y.o. LMP: Patient's last menstrual period was 2023 (approximate). Here today to discuss results from Colposcopy. She had RENAY 2 on her biopsy at the 12 o'clock position. She was counseled on repeat colposcopy in 3-4 months verses proceeding with LEEP. She wishes to proceed with LEEP. Risks and benefits and alternatives of surgery were discussed with patient.  Risks are not limited to anesthesia, bleeding, blood transfusion, infection, damage to surrounding organs, wound separation, re-operation, thromboembolic disease, death.      Social History     Substance and Sexual Activity   Sexual Activity Yes    Partners: Male    Birth control/protection: Patch           History: PMHx, Meds, Allergies, PSHx, Social Hx, and POBHx all reviewed and updated.    PHYSICAL EXAM:  /81   Pulse 74   Ht 154.9 cm (61\")   Wt 45.3 kg (99 lb 12.8 oz)   LMP 2023 (Approximate)   BMI 18.86 kg/m²   General- NAD, alert and oriented, appropriate  Psych- Normal mood, good memory  Neck- No masses, no thyroid enlargement      ASSESSMENT AND PLAN:  Diagnoses and all orders for this visit:    1. Dysplasia of cervix, high grade RENAY 2 (Primary)  Overview:  Added automatically from request for surgery [id number]       -LEEP  TRACK MENSES, RTO if <q21d, >7d long, heavy or painful.      Follow Up:  Return in about 2 weeks (around 2023) for Post op visit.    I spent 20 minutes caring for Jami on this date of service. This time includes time spent by me in the following activities:preparing for the visit, reviewing tests, obtaining and/or reviewing a separately obtained history, performing a medically appropriate examination and/or evaluation , and counseling and educating the patient/family/caregiver    Yohana Paul DO  2023    [facility] [place] [facility]  [address] [place] [address]  Dept: [phone]  Dept Fax: [phone]  Loc: [phone]  Loc Fax: [phone]

## 2023-10-11 RX ORDER — MULTIPLE VITAMINS W/ MINERALS TAB 9MG-400MCG
1 TAB ORAL DAILY
COMMUNITY

## 2023-10-12 NOTE — H&P
GYN HISTORY & PHYSICAL      Patient Name: Jami Gutierrez  : 1995  MRN: [id number]    Subjective     Chief Complaint: No chief complaint on file.      HPI:  28 y.o.  Patient's last menstrual period was 10/11/2023.. Patient here today to undergo LEEP for RENAY 2.   She wishes to proceed with the above procedure. She denies any F/C, D/C, N/V, CP or SOB.     Risks and benefits and alternatives of surgery were discussed with patient.  Risks are not limited to anesthesia, bleeding, blood transfusion, infection, damage to surrounding organs, wound separation, re-operation, thromboembolic disease, death.            ROS: Review of Systems   Constitutional: Negative.    HENT: Negative.     Eyes: Negative.    Respiratory: Negative.     Cardiovascular: Negative.    Gastrointestinal: Negative.    Endocrine: Negative.    Genitourinary: Negative.    Musculoskeletal: Negative.    Skin: Negative.    Allergic/Immunologic: Negative.    Neurological: Negative.    Hematological: Negative.    Psychiatric/Behavioral: Negative.           Personal History     PMHx:    Past Medical History:   Diagnosis Date    Abnormal Pap smear of cervix     LSIL    Abnormal uterine bleeding     Anxiety     Migraine     Ovarian cyst        OBHx:   OB History    Para Term  AB Living   2 1 1   1 1   SAB IAB Ectopic Molar Multiple Live Births     1     0 1      # Outcome Date GA Lbr Elian/2nd Weight Sex Delivery Anes PTL Lv   2 Term 23 39w4d 12:20 / 00:18 2705 g (5 lb 15.4 oz) M Vag-Spont EPI N ELAN   1 IAB 2017                Home Medications:  Ferrous Gluconate, ibuprofen, multivitamin with minerals, and norelgestromin-ethinyl estradiol    Allergies:  Allergies   Allergen Reactions    Latex Hives    Penicillins Hives       PSHx:   Past Surgical History:   Procedure Laterality Date    ENDOMETRIAL BIOPSY         Social History:    reports that she has never smoked. She has been exposed to tobacco smoke. She has never used  smokeless tobacco. She reports that she does not currently use alcohol. She reports that she does not use drugs.      Objective     Vitals:   Temp:  [97 øF (36.1 øC)] 97 øF (36.1 øC)  Heart Rate:  [66] 66  Resp:  [18] 18  BP: (116)/(70) 116/70    PHYSICAL EXAM:   General- NAD, alert and oriented, appropriate  Psych- Normal mood, good memory  CV- Regular rhythm, no murnurs  Resp- CTA to bases, no wheezes  Abdomen- NABS, soft, non distended, non tender, no masses  Pelvic- Defer to OR     Lab/Imaging/Other:  Surgical Pathology Report (Final result) [id number]  Authorizing Provider: Yohana Paul DO Ordering Provider: Yohana Paul DO  Ordering Location: [facility]  OBGYN  Collected: 08/22/2023 1125  Pathologist: Barbie Clements DO Received: 08/22/2023 1125  .  Specimens  1 Cervix, Endocervix, CXBX 12 o clock  2 Cervix, ECC  .  Clinical Information  LGSIL on Pap smear of cervix  .  Final Diagnosis  1. Cervix, 12:00, biopsy:  - High grade squamous intraepithelial lesion (RENAY 2)  2. Endocervix, curettage:  - Negative for squamous intraepithelial lesio  Assessment / Plan     Assessment:  RENAY 2    Plan:   LEEP  Patient to follow up in office in 2 weeks for post op visit. All questions and concerns have been answered.       Counseling: Risks and benefits and alternatives of surgery were discussed with patient.  Risks are not limited to anesthesia, bleeding, blood transfusion, infection, damage to surrounding organs, wound separation, re-operation, thromboembolic disease, death.  See separate written and signed consent for surgical specific risks and benefits.        Signature:   Electronically signed by:    Yohana Paul DO  10/13/23  08:43 EDT

## 2023-10-13 ENCOUNTER — HOSPITAL ENCOUNTER (OUTPATIENT)
Facility: HOSPITAL | Age: 28
Setting detail: HOSPITAL OUTPATIENT SURGERY
Discharge: HOME | End: 2023-10-13
Attending: OBSTETRICS & GYNECOLOGY | Admitting: OBSTETRICS & GYNECOLOGY
Payer: COMMERCIAL

## 2023-10-13 ENCOUNTER — ANESTHESIA (OUTPATIENT)
Dept: PERIOP | Facility: HOSPITAL | Age: 28
End: 2023-10-13
Payer: COMMERCIAL

## 2023-10-13 ENCOUNTER — ANESTHESIA EVENT (OUTPATIENT)
Dept: PERIOP | Facility: HOSPITAL | Age: 28
End: 2023-10-13
Payer: COMMERCIAL

## 2023-10-13 VITALS
WEIGHT: 103.17 LBS | RESPIRATION RATE: 16 BRPM | HEIGHT: 61 IN | BODY MASS INDEX: 19.48 KG/M2 | DIASTOLIC BLOOD PRESSURE: 69 MMHG | SYSTOLIC BLOOD PRESSURE: 122 MMHG | OXYGEN SATURATION: 73 % | HEART RATE: 73 BPM | TEMPERATURE: 97.2 F

## 2023-10-13 DIAGNOSIS — N87.1 DYSPLASIA OF CERVIX, HIGH GRADE CIN 2: ICD-10-CM

## 2023-10-13 LAB — B-HCG UR QL: NEGATIVE

## 2023-10-13 PROCEDURE — 88305 TISSUE EXAM BY PATHOLOGIST: CPT | Performed by: OBSTETRICS & GYNECOLOGY

## 2023-10-13 PROCEDURE — 25010000002 ONDANSETRON PER 1 MG: Performed by: NURSE ANESTHETIST, CERTIFIED REGISTERED

## 2023-10-13 PROCEDURE — 81025 URINE PREGNANCY TEST: CPT | Performed by: OBSTETRICS & GYNECOLOGY

## 2023-10-13 PROCEDURE — 25010000002 MIDAZOLAM PER 1MG: Performed by: ANESTHESIOLOGY

## 2023-10-13 PROCEDURE — 25010000002 PROPOFOL 10 MG/ML EMULSION: Performed by: NURSE ANESTHETIST, CERTIFIED REGISTERED

## 2023-10-13 PROCEDURE — 57461 CONZ OF CERVIX W/SCOPE LEEP: CPT | Performed by: OBSTETRICS & GYNECOLOGY

## 2023-10-13 PROCEDURE — 25010000002 FENTANYL CITRATE (PF) 50 MCG/ML SOLUTION: Performed by: NURSE ANESTHETIST, CERTIFIED REGISTERED

## 2023-10-13 PROCEDURE — 25010000002 DEXAMETHASONE PER 1 MG: Performed by: NURSE ANESTHETIST, CERTIFIED REGISTERED

## 2023-10-13 PROCEDURE — 25010000002 KETOROLAC TROMETHAMINE PER 15 MG: Performed by: NURSE ANESTHETIST, CERTIFIED REGISTERED

## 2023-10-13 PROCEDURE — 88307 TISSUE EXAM BY PATHOLOGIST: CPT | Performed by: OBSTETRICS & GYNECOLOGY

## 2023-10-13 PROCEDURE — 25810000003 LACTATED RINGERS PER 1000 ML: Performed by: ANESTHESIOLOGY

## 2023-10-13 RX ORDER — LIDOCAINE HYDROCHLORIDE 20 MG/ML
INJECTION, SOLUTION EPIDURAL; INFILTRATION; INTRACAUDAL; PERINEURAL AS NEEDED
Status: DISCONTINUED | OUTPATIENT
Start: 2023-10-13 | End: 2023-10-13 | Stop reason: SURG

## 2023-10-13 RX ORDER — ONDANSETRON 2 MG/ML
INJECTION INTRAMUSCULAR; INTRAVENOUS AS NEEDED
Status: DISCONTINUED | OUTPATIENT
Start: 2023-10-13 | End: 2023-10-13 | Stop reason: SURG

## 2023-10-13 RX ORDER — SODIUM CHLORIDE 0.9 % (FLUSH) 0.9 %
3 SYRINGE (ML) INJECTION EVERY 12 HOURS SCHEDULED
Status: DISCONTINUED | OUTPATIENT
Start: 2023-10-13 | End: 2023-10-13 | Stop reason: HOSPADM

## 2023-10-13 RX ORDER — SODIUM CHLORIDE 9 MG/ML
40 INJECTION, SOLUTION INTRAVENOUS AS NEEDED
Status: DISCONTINUED | OUTPATIENT
Start: 2023-10-13 | End: 2023-10-13 | Stop reason: HOSPADM

## 2023-10-13 RX ORDER — SODIUM CHLORIDE, SODIUM LACTATE, POTASSIUM CHLORIDE, CALCIUM CHLORIDE 600; 310; 30; 20 MG/100ML; MG/100ML; MG/100ML; MG/100ML
9 INJECTION, SOLUTION INTRAVENOUS CONTINUOUS PRN
Status: DISCONTINUED | OUTPATIENT
Start: 2023-10-13 | End: 2023-10-13 | Stop reason: HOSPADM

## 2023-10-13 RX ORDER — DEXAMETHASONE SODIUM PHOSPHATE 4 MG/ML
INJECTION, SOLUTION INTRA-ARTICULAR; INTRALESIONAL; INTRAMUSCULAR; INTRAVENOUS; SOFT TISSUE AS NEEDED
Status: DISCONTINUED | OUTPATIENT
Start: 2023-10-13 | End: 2023-10-13 | Stop reason: SURG

## 2023-10-13 RX ORDER — ONDANSETRON 2 MG/ML
4 INJECTION INTRAMUSCULAR; INTRAVENOUS ONCE AS NEEDED
Status: DISCONTINUED | OUTPATIENT
Start: 2023-10-13 | End: 2023-10-13 | Stop reason: HOSPADM

## 2023-10-13 RX ORDER — MIDAZOLAM HYDROCHLORIDE 2 MG/2ML
2 INJECTION, SOLUTION INTRAMUSCULAR; INTRAVENOUS ONCE
Status: COMPLETED | OUTPATIENT
Start: 2023-10-13 | End: 2023-10-13

## 2023-10-13 RX ORDER — LUGOLS 0.4 G/G
LIQUID TOPICAL AS NEEDED
Status: DISCONTINUED | OUTPATIENT
Start: 2023-10-13 | End: 2023-10-13 | Stop reason: HOSPADM

## 2023-10-13 RX ORDER — KETOROLAC TROMETHAMINE 30 MG/ML
INJECTION, SOLUTION INTRAMUSCULAR; INTRAVENOUS AS NEEDED
Status: DISCONTINUED | OUTPATIENT
Start: 2023-10-13 | End: 2023-10-13 | Stop reason: SURG

## 2023-10-13 RX ORDER — PROMETHAZINE HYDROCHLORIDE 25 MG/1
25 SUPPOSITORY RECTAL ONCE AS NEEDED
Status: DISCONTINUED | OUTPATIENT
Start: 2023-10-13 | End: 2023-10-13 | Stop reason: HOSPADM

## 2023-10-13 RX ORDER — OXYCODONE HYDROCHLORIDE 5 MG/1
5 TABLET ORAL
Status: DISCONTINUED | OUTPATIENT
Start: 2023-10-13 | End: 2023-10-13 | Stop reason: HOSPADM

## 2023-10-13 RX ORDER — PROMETHAZINE HYDROCHLORIDE 12.5 MG/1
25 TABLET ORAL ONCE AS NEEDED
Status: DISCONTINUED | OUTPATIENT
Start: 2023-10-13 | End: 2023-10-13 | Stop reason: HOSPADM

## 2023-10-13 RX ORDER — SODIUM CHLORIDE 0.9 % (FLUSH) 0.9 %
10 SYRINGE (ML) INJECTION AS NEEDED
Status: DISCONTINUED | OUTPATIENT
Start: 2023-10-13 | End: 2023-10-13 | Stop reason: HOSPADM

## 2023-10-13 RX ORDER — LIDOCAINE HYDROCHLORIDE AND EPINEPHRINE 10; 10 MG/ML; UG/ML
INJECTION, SOLUTION INFILTRATION; PERINEURAL AS NEEDED
Status: DISCONTINUED | OUTPATIENT
Start: 2023-10-13 | End: 2023-10-13 | Stop reason: HOSPADM

## 2023-10-13 RX ORDER — FENTANYL CITRATE 50 UG/ML
INJECTION, SOLUTION INTRAMUSCULAR; INTRAVENOUS AS NEEDED
Status: DISCONTINUED | OUTPATIENT
Start: 2023-10-13 | End: 2023-10-13 | Stop reason: SURG

## 2023-10-13 RX ORDER — ACETAMINOPHEN 500 MG
1000 TABLET ORAL ONCE
Status: COMPLETED | OUTPATIENT
Start: 2023-10-13 | End: 2023-10-13

## 2023-10-13 RX ORDER — PROPOFOL 10 MG/ML
VIAL (ML) INTRAVENOUS AS NEEDED
Status: DISCONTINUED | OUTPATIENT
Start: 2023-10-13 | End: 2023-10-13 | Stop reason: SURG

## 2023-10-13 RX ORDER — MEPERIDINE HYDROCHLORIDE 25 MG/ML
12.5 INJECTION INTRAMUSCULAR; INTRAVENOUS; SUBCUTANEOUS
Status: DISCONTINUED | OUTPATIENT
Start: 2023-10-13 | End: 2023-10-13 | Stop reason: HOSPADM

## 2023-10-13 RX ADMIN — PROPOFOL 200 MG: 10 INJECTION, EMULSION INTRAVENOUS at 09:11

## 2023-10-13 RX ADMIN — KETOROLAC TROMETHAMINE 30 MG: 30 INJECTION, SOLUTION INTRAMUSCULAR; INTRAVENOUS at 09:12

## 2023-10-13 RX ADMIN — FENTANYL CITRATE 50 MCG: 50 INJECTION, SOLUTION INTRAMUSCULAR; INTRAVENOUS at 09:16

## 2023-10-13 RX ADMIN — FENTANYL CITRATE 50 MCG: 50 INJECTION, SOLUTION INTRAMUSCULAR; INTRAVENOUS at 09:11

## 2023-10-13 RX ADMIN — MIDAZOLAM HYDROCHLORIDE 2 MG: 1 INJECTION, SOLUTION INTRAMUSCULAR; INTRAVENOUS at 08:42

## 2023-10-13 RX ADMIN — SODIUM CHLORIDE, POTASSIUM CHLORIDE, SODIUM LACTATE AND CALCIUM CHLORIDE 9 ML/HR: 600; 310; 30; 20 INJECTION, SOLUTION INTRAVENOUS at 07:57

## 2023-10-13 RX ADMIN — LIDOCAINE HYDROCHLORIDE 50 MG: 20 INJECTION, SOLUTION EPIDURAL; INFILTRATION; INTRACAUDAL; PERINEURAL at 09:11

## 2023-10-13 RX ADMIN — DEXAMETHASONE SODIUM PHOSPHATE 8 MG: 4 INJECTION, SOLUTION INTRAMUSCULAR; INTRAVENOUS at 09:12

## 2023-10-13 RX ADMIN — ACETAMINOPHEN 1000 MG: 500 TABLET ORAL at 07:57

## 2023-10-13 RX ADMIN — ONDANSETRON 4 MG: 2 INJECTION INTRAMUSCULAR; INTRAVENOUS at 09:12

## 2023-10-13 NOTE — ANESTHESIA PREPROCEDURE EVALUATION
Anesthesia Evaluation     Patient summary reviewed and Nursing notes reviewed                Airway   Mallampati: I  TM distance: >3 FB  Neck ROM: full  No difficulty expected  Dental      Pulmonary - negative pulmonary ROS and normal exam    breath sounds clear to auscultation  Cardiovascular - negative cardio ROS and normal exam    Rhythm: regular  Rate: normal        Neuro/Psych  (+) headaches, psychiatric history  GI/Hepatic/Renal/Endo - negative ROS     Musculoskeletal (-) negative ROS    Abdominal    Substance History - negative use     OB/GYN negative ob/gyn ROS         Other      history of cancer                Anesthesia Plan    ASA 1     general     intravenous induction     Anesthetic plan, risks, benefits, and alternatives have been provided, discussed and informed consent has been obtained with: patient.    CODE STATUS:    Code Status (Patient has no pulse and is not breathing): CPR (Attempt to Resuscitate)  Medical Interventions (Patient has pulse or is breathing): Full Support

## 2023-10-13 NOTE — ANESTHESIA POSTPROCEDURE EVALUATION
Patient: Jami Gutierrez    Procedure Summary       Date: 10/13/23 Room / Location: [facility] OR 08 / [facility] MAIN OR    Anesthesia Start: 0906 Anesthesia Stop: 0949    Procedure: CERVICAL CONIZATION, LOOP ELECTROCAUTERY EXCISION PROCEDURE (Vagina) Diagnosis:       Dysplasia of cervix, high grade RENAY 2      (Dysplasia of cervix, high grade RENAY 2 [N87.1])    Surgeons: Yohana Paul DO Provider: Alfredo Henley MD    Anesthesia Type: general ASA Status: 1            Anesthesia Type: general    Vitals  Vitals Value Taken Time   /71 10/13/23 1017   Temp 36.4 øC (97.6 øF) 10/13/23 1011   Pulse 63 10/13/23 1017   Resp 16 10/13/23 1016   SpO2 98 % 10/13/23 1017   Vitals shown include unfiled device data.        Post Anesthesia Care and Evaluation    Patient location during evaluation: bedside  Patient participation: complete - patient participated  Level of consciousness: awake  Pain management: adequate    Airway patency: patent  PONV Status: none  Cardiovascular status: acceptable  Respiratory status: acceptable  Hydration status: acceptable    Comments: An Anesthesiologist personally participated in the most demanding procedures (including induction and emergence if applicable) in the anesthesia plan, monitored the course of anesthesia administration at frequent intervals and remained physically present and available for immediate diagnosis and treatment of emergencies.

## 2023-10-13 NOTE — DISCHARGE INSTRUCTIONS
DISCHARGE INSTRUCTIONS  GYNECOLOGICAL  PROCEDURES      For your surgery you had:  General anesthesia (you may have a sore throat for the first 24 hours)  IV sedation  Local anesthesia  Monitored anesthesia care  You received a medicated patch for nausea prevention today (behind your ear). It is recommended that you remove it 24-48 hours post-operatively. It must be removed within 72 hours.  You received an anesthesia medication today that can cause hormonal forms of birth control to be ineffective. You should use a different form of birth control (to prevent pregnancy) for 7 days.   You may experience dizziness, drowsiness, or lightheadedness for several hours following surgery.  Do not stay alone today or tonight.  Limit your activity for 24 hours.  Resume your diet slowly.  Follow any special dietary instructions you may have been given by your doctor.  You should not drive or operate machinery, drink alcohol, or sign legally binding documents for 24 hours or while you are taking pain medication.  Last dose of pain medication was given at:  Tylenol 1000 mg at 7:57 am- may repeat tylenol at 11;57 am or later-do not exceed 4000 mg of tylenol in 24 hours, toradol 30 mg at 9:12 am- may repeat ibuprofen if needed at 3:12 pm or later. It is ok to alternate tylenol and ibuprofen for pain control.  NOTIFY YOUR DOCTOR IF YOU EXPERIENCE ANY OF THE FOLLOWING:  Temperature greater than 101 degrees Fahrenheit  Shaking Chills  Redness or excessive drainage from incision  Nausea, vomiting and/or pain that is not controlled by prescribed medications  Increase in bleeding or bleeding that is excessive  Unable to urinate in 6 hours after surgery  If unable to reach your doctor, please go to the closest Emergency Room     [] Nothing in the vagina for 2 weeks to include intercourse, douches, or tampons.  [] Vaginal bleeding may be expected for several days with flow decreasing with time and never any heavier than a normal    period.  If you have foul smelling discharge, notify your physician.  Medications per physician instructions as indicated on Discharge Medication Information Sheet.

## 2023-10-13 NOTE — OP NOTE
PRE OP DIAGNOSIS:  RENAY 2    POST OP DIAGNOSIS:  same    PROCEDURE: Loop electrosurgical excision procedure     SURGEON:  Yohana Paul DO    ANESTHESIA PROVIDER:  Alfredo Henley MD    ANESTHESIA TYPE:  General    ESTIMATED BLOOD LOSS:  5ml     COMPLICATIONS:  None    DISPOSITION:  To recovery room in stable condition    FINDINGS:  no acetowhite changes noted with acetic acid solution     SPECIMEN: Ectocervix marked at 12 and Endocervical curettage's     PROCEDURE:  The patient was brought to the operating room where anesthesia was placed and deemed to be adequate.  She was prepped and draped in a normal sterile fashion and placed in high lithotomy position.  I was then called into the room.  Speculum was placed in the vagina.  Vinegar solution and Lugol's was used to coat the cervix and vagina.      Colposcopy was performed with findings as above.  [A cervical block was placed with 10ml of local anesthetic with epinephrine.A size 20 mm loop was used.    The entire transformation zone and all visual colposcopic lesions removed.  The specimen was handed off for permanent pathology and pinned in paraffin.  An endocervical curettage was then performed.  The LEEP bed was made hemostatic with Bovie cautery and monsels solution.    The patient tolerated the procedure well.  Instrument, lap, and needle counts were correct.  She received antibiotics prior to procedure.  She was taken to recovery room in stable condition.      Electronically signed by:    Yohana Paul DO  10/13/23  09:41 EDT

## 2023-10-24 ENCOUNTER — OFFICE VISIT (OUTPATIENT)
Dept: OBSTETRICS AND GYNECOLOGY | Facility: CLINIC | Age: 28
End: 2023-10-24
Payer: COMMERCIAL

## 2023-10-24 VITALS
BODY MASS INDEX: 19.45 KG/M2 | HEIGHT: 61 IN | HEART RATE: 69 BPM | WEIGHT: 103 LBS | SYSTOLIC BLOOD PRESSURE: 104 MMHG | DIASTOLIC BLOOD PRESSURE: 76 MMHG

## 2023-10-24 DIAGNOSIS — Z98.890 POST-OPERATIVE STATE: Primary | ICD-10-CM

## 2023-10-24 PROCEDURE — 99024 POSTOP FOLLOW-UP VISIT: CPT | Performed by: OBSTETRICS & GYNECOLOGY

## 2023-10-24 NOTE — PROGRESS NOTES
"Post Operative Visit        Chief Complaint   Patient presents with    Post-op       HPI:   Jami Gutierrez is here for a post op visit.  She had a LEEP and has no complaints.  We have gone over her pathology and any available pictures and all questions have been answered. Margins were negative, patient to have repeat pap smear in 1 year.     Pain:  No  Vaginal bleeding:  Yes  Vaginal discharge:  Yes  Fever/chills:  No  Good appetite:  Yes  Normal bladder function:  Yes  Normal bowel function:  Yes  Hot flashes: No    PHYSICAL EXAM:  /76   Pulse 69   Ht 154.9 cm (61\")   Wt 46.7 kg (103 lb)   LMP 10/11/2023 Comment: Patch  BMI 19.46 kg/m²   General- NAD, alert and oriented, appropriate  Psych- Normal mood, good memory  Abdomen- Soft, non distended, non tender, no masses  Lymphatic- No palpable groin nodes  Ext- No edema, no cyanosis   Skin- No lesions, no rashes        ASSESSMENT and PLAN:  Post-operative exam    Diagnoses and all orders for this visit:    1. Post-operative state (Primary)        Operative report, surgical findings and any pathology/photos reviewed.  All questions answered.     Counseling:   TRACK MENSES, RTO if <q21 days (frequent) or >q3mo (infrequent IF not on hormonal BC), >7d long, heavy, or painful.        Follow Up:  Return in about 8 days (around 11/1/2023) for Nexplanon placement .    I spent 20 minutes on the separately reported service of Post op. This time is not included in the time used to support the E/M service also reported today.      Yohana Paul,   10/24/2023    [facility] [place] [facility]  [address] [place] [address]  Dept: [phone]  Dept Fax: [phone]  Loc: [phone]  Loc Fax: [phone]     "

## 2023-11-01 ENCOUNTER — TELEPHONE (OUTPATIENT)
Dept: OBSTETRICS AND GYNECOLOGY | Facility: CLINIC | Age: 28
End: 2023-11-01
Payer: COMMERCIAL

## 2023-11-06 ENCOUNTER — PROCEDURE VISIT (OUTPATIENT)
Dept: OBSTETRICS AND GYNECOLOGY | Facility: CLINIC | Age: 28
End: 2023-11-06
Payer: COMMERCIAL

## 2023-11-06 VITALS
WEIGHT: 103.8 LBS | HEIGHT: 61 IN | BODY MASS INDEX: 19.6 KG/M2 | HEART RATE: 71 BPM | SYSTOLIC BLOOD PRESSURE: 117 MMHG | DIASTOLIC BLOOD PRESSURE: 78 MMHG

## 2023-11-06 DIAGNOSIS — Z01.812 PRE-PROCEDURAL LABORATORY EXAMINATION: ICD-10-CM

## 2023-11-06 DIAGNOSIS — Z30.017 NEXPLANON INSERTION: Primary | ICD-10-CM

## 2023-11-06 LAB
B-HCG UR QL: NEGATIVE
EXPIRATION DATE: NORMAL
GLUCOSE UR STRIP-MCNC: NEGATIVE MG/DL
INTERNAL NEGATIVE CONTROL: NORMAL
INTERNAL POSITIVE CONTROL: NORMAL
Lab: NORMAL
PROT UR STRIP-MCNC: NEGATIVE MG/DL

## 2023-11-06 NOTE — PROGRESS NOTES
Subdermal Contraceptive Implant Insertion Note    Jami Gutierrez desires a subdermal etonogestrel contraceptive implant insertion.  She has been counseled regarding the risks, benefits and alternatives to the implant.  She especially understands that her menstrual periods are expected to become irregular and unpredictable throughout the time she is using the implant.  She has no contraindications to the insertion.  Her questions have been answered.  She has fully reviewed the FDA-approved consent brochure, has signed the consent form, and wishes to proceed with the insertion today.     Current method of contraception:  Ortho-Evra    Patient's last menstrual period was 11/05/2023.    Urine pregnancy test: negative    Procedure Time Out Documentation  The correct patient, site and procedure were confirmed    Procedure Details  The inner side of the left arm was cleaned with Betadinex3 and infiltrated with 1% lidocaine with epinephrine.  The contraceptive erick was inserted according to the 's instructions without complications.  The erick was palpable under the skin after the insertion.  The insertion site was closed with Steri-strip and a pressure dressing was applied.    Jami was given post-insertion instructions.  She understands that the implant must be removed at the end of three years and may be removed sooner if she wishes.    Successful insertion of nexplanon device.    Patient tolerated the procedure well without complications.    Electronically signed by Joseluis Anthony MD, 11/06/23, 2:37 PM EST.

## 2024-01-08 NOTE — SIGNIFICANT NOTE
Discharge instructions given and explained, verbalizes understanding. Left floor ambulatory with family. No distress noted.   [facility]... [facility]...

## 2024-06-19 ENCOUNTER — OFFICE VISIT (OUTPATIENT)
Dept: OBSTETRICS AND GYNECOLOGY | Facility: CLINIC | Age: 29
End: 2024-06-19
Payer: COMMERCIAL

## 2024-06-19 VITALS
BODY MASS INDEX: 22.09 KG/M2 | DIASTOLIC BLOOD PRESSURE: 84 MMHG | SYSTOLIC BLOOD PRESSURE: 116 MMHG | HEIGHT: 61 IN | WEIGHT: 117 LBS

## 2024-06-19 DIAGNOSIS — Z87.42 HX OF ENDOMETRIOSIS: Primary | ICD-10-CM

## 2024-06-19 NOTE — PROGRESS NOTES
"GYN Visit    Chief Complaint   Patient presents with    Follow-up     Discuss tx options for endometriosis       HPI:   28 y.o. LMP: No LMP recorded (lmp unknown). Patient has had an implant. Here today to discuss persistent pain with endometriosis. She has never had a diagnostic laparoscopy with surgically diagnose endometriosis but she has been clonically diagnosed with pain with menses, bowel movements and intercourse. She is on the nexplanon for birth control and states menses are usually light but painful. She has tried NSAIDS but these do not improve the pain. She was counseled on trying Orlissa 200mg BID due to pain with intercourse as well and to try this for 6 months. She is due for annual in October with repeat pap smear due to abnormal pap with need for LEEP back in 2023. She does desire to try orlissa.       History: PMHx, Meds, Allergies, PSHx, Social Hx, and POBHx all reviewed and updated.    PHYSICAL EXAM:  /84   Ht 154.9 cm (61\")   Wt 53.1 kg (117 lb)   LMP  (LMP Unknown) Comment: Nexplanon  BMI 22.11 kg/m²   General- NAD, alert and oriented, appropriate  Psych- Normal mood, good memory    Neck- No masses, no thyroid enlargement  Skin- No lesions, no rashes    ASSESSMENT AND PLAN:  Diagnoses and all orders for this visit:    1. Hx of endometriosis (Primary)  -     elagolix (ORILISSA) 200 MG tablet tablet; Take 1 tablet by mouth 2 (Two) Times a Day.  Dispense: 60 tablet; Refill: 5      TRACK MENSES, RTO if <q21 days (frequent) or >q3mo (infrequent IF not on hormonal BC), >7d long, heavy, or painful.    ORILISSA PRECERT initiated with [facility].  Pt is 19y/o or older AND has mod-severe endometriosis associated pain,  a trial of NSAIDs AND hormonal (eg OCP or Depo) showed inadequate response or intolerance.  She has either NEVER used Orilissa or used low dose <2yrs or high dose <6mo.     Follow Up:  Return in about 4 months (around 10/19/2024) for Annual " exam with repeat pap smear.    I spent 20 minutes caring for Jami on this date of service. This time includes time spent by me in the following activities:preparing for the visit, reviewing tests, obtaining and/or reviewing a separately obtained history, counseling and educating the patient/family/caregiver, ordering medications, tests, or procedures, and documenting information in the medical record    Yohana Pual DO  06/19/2024    [facility] [place] [facility]  [facility] [place] [address]  Dept: [phone]  Dept Fax: [phone]  Loc: [phone]  Loc Fax: [phone]

## 2024-06-21 ENCOUNTER — TELEPHONE (OUTPATIENT)
Dept: OBSTETRICS AND GYNECOLOGY | Facility: CLINIC | Age: 29
End: 2024-06-21

## 2024-06-21 NOTE — TELEPHONE ENCOUNTER
Provider: DR SHELDON     Caller: AMANDA ROBERT       Reason for Call: AMANDA FROM PHARMACY IS CALLING TO CHECK THE STATUS OF THE PA FOR THE MEDICATION ORLISSA PLEASE CALL

## 2024-08-02 NOTE — PROGRESS NOTES
OB FOLLOW UP  CC- Here for care of pregnancy        Jami Gutierrez is a 27 y.o.  31w1d patient being seen today for her obstetrical follow up visit. Patient reports no complaints.     Her prenatal care is complicated by (and status) :    Patient Active Problem List   Diagnosis   • Menometrorrhagia   • Idiopathic scoliosis and kyphoscoliosis   • Encounter for supervision of normal pregnancy, antepartum   • Iron deficiency anemia during pregnancy   • Late prenatal care affecting pregnancy in second trimester       Flu Status: Declines  Covid Status:    ROS -   Patient Reports : No Problems  Patient Denies: Loss of Fluid and Vaginal Spotting  Fetal Movement : normal  All other systems reviewed and are negative.       The additional following portions of the patient's history were reviewed and updated as appropriate: allergies, current medications, past family history, past medical history, past social history, past surgical history and problem list.    I have reviewed and agree with the HPI, ROS, and historical information as entered above. Bijal Quinteros, DO    /81   Wt 52.2 kg (115 lb)   BMI 21.73 kg/m²       EXAM:     FHT: 148 BPM   Uterine Size: 28 cm  Pelvic Exam: No    Urine glucose/protein: See prenatal flowsheet       Assessment and Plan  Diagnoses and all orders for this visit:    1. Encounter for supervision of normal pregnancy, antepartum, unspecified   Overview:  Initial visit:  • PNL:NML  • PAP/GC/CT/Urine culture: > 100k E Coli  • Blood type:O Pos  • Hx of HSV?: No    Genetic testing:    • Considering     Vaccinations:  • COVID:   • Influenza:     24-28 weeks:  • 1hr GCT:  • Hct/Plt:  • Tdap Rx  • Rhogam indicated:      Third Trimester:  • GBS  • Breast pump:    Ultrasound:  • Dating:   • Anatomy: 23:  KENISHA confirmed SIUP + cardiac activity @151. All anatomy seen and appears nml , Vertex, post placenta Grade 1 male  • Follow up:     PLAN:        Orders:  -     POC  Urinalysis Dipstick  -     Urine Culture - Urine, Urine, Clean Catch  -     US Ob 14 + Weeks Single or First Gestation; Future       1. Pregnancy at 31w1d  2. Fetal status reassuring.   3. Activity and Exercise discussed.  Return in about 2 weeks (around 4/20/2023) for pls sched sono 4 wks and appt re: size less than dates i have placed order.    Bijal Quinteros,   04/06/2023   [de-identified] : proceed L5-S1 LES1 taking gabapentin 300mg 1 a day, does not want to increase   After discussing various treatment options with the patient including but not limited to oral medications, physical therapy, exercise, modalities as well as interventional spinal injections, we have decided with the following plan  I personally reviewed the MRI/CT scan images and agree with the radiologist's report. The radiological findings were discussed with the patient.   The risks, benefits, contents and alternatives to injection were explained in full to the patient. Risks outlined include but are not limited to infection,sepsis, bleeding, post-dural puncture headache, nerve damage, temporary increase in pain, syncopal episode, failure to resolve symptoms, allergic reaction, symptom recurrence, and elevation of blood sugar in diabetics. Cortisone may cause immunosuppression. Patient understands the risks. All questions were answered. After discussion of options, patient requested an injection. Information regarding the injection was given to the patient. Which medications to stop prior to the injection was explained to the patient as well.  Follow up in 1-2 weeks post injection for re-evaluation.   Conservative Care Continue Home exercises, stretching, activity modification, physical therapy, and conservative care.

## 2024-08-14 ENCOUNTER — TELEPHONE (OUTPATIENT)
Dept: OBSTETRICS AND GYNECOLOGY | Facility: CLINIC | Age: 29
End: 2024-08-14

## 2024-08-14 NOTE — TELEPHONE ENCOUNTER
Hub staff attempted to follow warm transfer process and was unsuccessful     Caller: Jami Gutierrez    Relationship to patient: Self    Best call back number: [phone] / LVM    Patient is needing: PT IS NEEDING TO PAY THE $25 FEE FOR FMLA PAPERWORK - PLEASE CALL THE PT TO COMPLETE    THANK YOU!

## 2024-11-12 ENCOUNTER — TELEPHONE (OUTPATIENT)
Dept: OBSTETRICS AND GYNECOLOGY | Facility: CLINIC | Age: 29
End: 2024-11-12
Payer: COMMERCIAL

## 2024-11-22 ENCOUNTER — TELEPHONE (OUTPATIENT)
Dept: OBSTETRICS AND GYNECOLOGY | Facility: CLINIC | Age: 29
End: 2024-11-22
Payer: COMMERCIAL

## 2024-11-22 NOTE — TELEPHONE ENCOUNTER
[facility] for pt to call back regarding 1/6/2025 appt with Dr. Paul. Dr. Paul will not be in office that day. Pt will need to reschedule.

## 2024-12-03 ENCOUNTER — TELEPHONE (OUTPATIENT)
Dept: OBSTETRICS AND GYNECOLOGY | Facility: CLINIC | Age: 29
End: 2024-12-03

## 2024-12-03 NOTE — TELEPHONE ENCOUNTER
[id number]: need to reschedule your appointment with dr alvarez on [id number], she is not here that day. Please call the office to reschedule

## (undated) DEVICE — PAD SANI MAXI W/ADHS SNG WRP 11IN

## (undated) DEVICE — PACK,LITHOTOMY,PK I: Brand: MEDLINE

## (undated) DEVICE — NDL HYPO ECLPS SFTY 22G 1 1/2IN

## (undated) DEVICE — CAUTERY TIP POLISHER: Brand: DEVON

## (undated) DEVICE — GLV SURG BIOGEL LTX PF 6 1/2

## (undated) DEVICE — PREFLTR SMOKE/EVAC SURGIFRESH PROTECTION PLS 1 1/3IN

## (undated) DEVICE — NDL SPINE 22G 31/2IN BLK

## (undated) DEVICE — SLV SCD KN/LEN ADJ EXPRSS BLENDED MD 1P/U

## (undated) DEVICE — DRSNG TELFA PAD NONADH STR 1S 3X4IN

## (undated) DEVICE — MEDI-VAC NON-CONDUCTIVE SUCTION TUBING: Brand: CARDINAL HEALTH

## (undated) DEVICE — PENCL E/S HNDSWCH ROCKR CB

## (undated) DEVICE — ADAPT TBG PREFILTER 3/8TO1.33IN NS

## (undated) DEVICE — PROCTO SWABS: Brand: DEROYAL

## (undated) DEVICE — MINOR-LF: Brand: MEDLINE INDUSTRIES, INC.

## (undated) DEVICE — ELECTRD BALL LLETZ 5MM 13CM STRL

## (undated) DEVICE — STRAP STIRUP WO/ RNG

## (undated) DEVICE — SYR CONTRL PRESS/LO FIX/M/LL W/THMB/RNG 10ML

## (undated) DEVICE — ELECTRD LP COVIDIEN LLETZ TUNG 15X20MM